# Patient Record
Sex: MALE | Race: WHITE | Employment: OTHER | URBAN - METROPOLITAN AREA
[De-identification: names, ages, dates, MRNs, and addresses within clinical notes are randomized per-mention and may not be internally consistent; named-entity substitution may affect disease eponyms.]

---

## 2017-02-20 ENCOUNTER — DOCUMENTATION ONLY (OUTPATIENT)
Dept: CARDIOLOGY CLINIC | Age: 82
End: 2017-02-20

## 2017-02-20 NOTE — PROGRESS NOTES
Spoke with Mr. Kena Cantu, reviewed device check with him  He was unaware of longer episodes of PAF (2 hours/11 hours) Ventricular rate controlled. He is asymptomatic in general with AF. He denies lightheadedness, dizziness, SOB or fatigue. Still recovering from recent knee replacement. He says he has been taking eliquis 5 mg BID, every day. No reported bleeding.     Eliquis 5 mg BID added to [facility]

## 2017-03-07 ENCOUNTER — OFFICE VISIT (OUTPATIENT)
Dept: CARDIOLOGY CLINIC | Age: 82
End: 2017-03-07

## 2017-03-07 DIAGNOSIS — Z95.0 CARDIAC PACEMAKER IN SITU: Primary | ICD-10-CM

## 2017-05-31 RX ORDER — APIXABAN 5 MG/1
TABLET, FILM COATED ORAL
Qty: 180 TAB | Refills: 1 | Status: SHIPPED | OUTPATIENT
Start: 2017-05-31 | End: 2018-03-08 | Stop reason: SDUPTHER

## 2017-05-31 NOTE — TELEPHONE ENCOUNTER
Requested Prescriptions     Signed Prescriptions Disp Refills    ELIQUIS 5 mg tablet 180 Tab 1     Sig: Take 1 tablet by mouth two  times daily     Authorizing Provider: BEAR GRIDER     Ordering User: Tiffany Ramesh     Verbal order per Dr. Olya Yo. Follow up visit in 1 year on 9/22/17.

## 2017-06-13 ENCOUNTER — OFFICE VISIT (OUTPATIENT)
Dept: CARDIOLOGY CLINIC | Age: 82
End: 2017-06-13

## 2017-06-13 DIAGNOSIS — Z95.0 CARDIAC PACEMAKER IN SITU: Primary | ICD-10-CM

## 2017-08-10 ENCOUNTER — TELEPHONE (OUTPATIENT)
Dept: CARDIOLOGY CLINIC | Age: 82
End: 2017-08-10

## 2017-08-10 NOTE — TELEPHONE ENCOUNTER
Please call Mr. Washington Sat at [phone]. He's leaving to be out of town from 8/14/17 to 9/8/17. He'd like to know if the remote check device should be left on or cut off while he's away.      Thank you, Syeda Almeida

## 2017-08-10 NOTE — TELEPHONE ENCOUNTER
Spoke with patient. Assured him that it is okay to leave monitor on and we will make a note that he will be away from it.

## 2017-09-19 ENCOUNTER — OFFICE VISIT (OUTPATIENT)
Dept: CARDIOLOGY CLINIC | Age: 82
End: 2017-09-19

## 2017-09-19 ENCOUNTER — CLINICAL SUPPORT (OUTPATIENT)
Dept: CARDIOLOGY CLINIC | Age: 82
End: 2017-09-19

## 2017-09-19 VITALS
BODY MASS INDEX: 23.91 KG/M2 | RESPIRATION RATE: 16 BRPM | HEIGHT: 70 IN | DIASTOLIC BLOOD PRESSURE: 60 MMHG | SYSTOLIC BLOOD PRESSURE: 130 MMHG | HEART RATE: 60 BPM | WEIGHT: 167 LBS

## 2017-09-19 DIAGNOSIS — G89.29 CHRONIC PAIN OF BOTH SHOULDERS: ICD-10-CM

## 2017-09-19 DIAGNOSIS — M25.512 CHRONIC PAIN OF BOTH SHOULDERS: ICD-10-CM

## 2017-09-19 DIAGNOSIS — Z79.01 CHRONIC ANTICOAGULATION: ICD-10-CM

## 2017-09-19 DIAGNOSIS — M25.511 CHRONIC PAIN OF BOTH SHOULDERS: ICD-10-CM

## 2017-09-19 DIAGNOSIS — Z95.0 CARDIAC PACEMAKER IN SITU: Primary | ICD-10-CM

## 2017-09-19 DIAGNOSIS — Z95.0 PACEMAKER: Primary | ICD-10-CM

## 2017-09-19 DIAGNOSIS — I48.92 ATRIAL FLUTTER, PAROXYSMAL (HCC): ICD-10-CM

## 2017-09-19 DIAGNOSIS — R05.9 COUGH: ICD-10-CM

## 2017-09-19 DIAGNOSIS — I47.1 PAT (PAROXYSMAL ATRIAL TACHYCARDIA) (HCC): ICD-10-CM

## 2017-09-19 RX ORDER — SIMVASTATIN 40 MG/1
1 TABLET, FILM COATED ORAL
COMMUNITY
Start: 2017-05-30

## 2017-09-19 NOTE — MR AVS SNAPSHOT
Visit Information Date & Time Provider Department Dept. Phone Encounter #  
 9/19/2017 11:00 AM Jolie Rios MD [facility] Mattie Silva [phone] [id number] Your Appointments 10/16/2018 10:30 AM  
PACEMAKER with HERNAN[facility]MON[facility] (ABY SCHEDULING) Appt Note: magnus walton annual thresh/M, see [address] [place]  
[address] [place] [address] Dr  
  
    
 10/16/2018 10:40 AM  
ESTABLISHED PATIENT with Jolie Rios MD  
[facility] ([address]) Appt Note: magnus walton annual thresh/M, see [address] [id number]  
[address] [address]  
  
    
  
 12/27/2017  2:15 PM  
REMOTE OFFICE VISIT with [facility] (ABY SCHEDULING) Appt Note: magnus walton b 9/19/17  
 [address] [facility] [address] [place]  
[address] [address]  
  
    
 4/4/2018 10:00 AM  
REMOTE OFFICE VISIT with [facility] (ABY SCHEDULING) Appt Note: magnus walton  
 [address] [facility] [address] [place]  
[phone]  
  
    
 7/11/2018  9:15 AM  
REMOTE OFFICE VISIT with [facility] (ABY SCHEDULING) Appt Note: magnus walton  
 [address] [facility] [address] [place]  
[phone] Upcoming Health Maintenance Date Due DTaP/Tdap/Td series (1 - Tdap) 1/9/1951 ZOSTER VACCINE AGE 60> 11/9/1989 GLAUCOMA SCREENING Q2Y 1/9/1995 Pneumococcal 65+ Low/Medium Risk (1 of 2 - PCV13) 1/9/1995 MEDICARE YEARLY EXAM 1/9/1995 INFLUENZA AGE 9 TO ADULT 8/1/2017 Allergies as of 9/19/2017  Review Complete On: 9/19/2017 By: Jolie Rios MD  
 No Known Allergies Current Immunizations  Never Reviewed No immunizations on file. Not reviewed this visit You Were Diagnosed With   
  
 Codes Comments Pacemaker    -  Primary ICD-10-CM: Z95.0 ICD-9-CM: V45.01   
 PAT (paroxysmal atrial tachycardia) (HCC)     ICD-10-CM: I47.1 ICD-9-CM: 427.0 Chronic anticoagulation     ICD-10-CM: Z79.01 
ICD-9-CM: V58.61 Atrial flutter, paroxysmal (HCC)     ICD-10-CM: I48.92 
ICD-9-CM: 427.32 Vitals BP Pulse Resp Height(growth percentile) Weight(growth percentile) BMI  
 130/60 (BP 1 Location: Left arm, BP Patient Position: Sitting) 60 16 5' 10\" (1.778 m) 167 lb (75.8 kg) 23.96 kg/m2 Smoking Status Never Smoker Vitals History BMI and BSA Data Body Mass Index Body Surface Area  
 23.96 kg/m 2 1.93 m 2 Preferred Pharmacy Pharmacy Name Phone [address] [facility], [address] [place] [address] [address] [facility] [place] Your Updated Medication List  
  
   
This list is accurate as of: 9/19/17 11:35 AM.  Always use your most recent med list.  
  
  
  
  
 ELIQUIS 5 mg tablet Generic drug:  apixaban Take 1 tablet by mouth two  times daily  
  
 simvastatin 40 mg tablet Commonly known as:  ZOCOR Take 1 Tab by mouth nightly. TYLENOL EXTRA STRENGTH 500 mg tablet Generic drug:  acetaminophen Take 1,000 mg by mouth every six (6) hours as needed for Pain. Patient Instructions You will need to follow up in clinic with Dr. Faustina Lemon in 1 year. Introducing [facility]! [employer] introduces [employer] patient portal. Now you can access parts of your medical record, email your doctor's office, and request medication refills online. 1. In your internet browser, go to [email or web address] 2. Click on the First Time User? Click Here link in the Sign In box. You will see the New Member Sign Up page. 3. Enter your [facility] Access Code exactly as it appears below. You will not need to use this code after youve completed the sign-up process. If you do not sign up before the expiration date, you must request a new code. · [facility] Access Code: [email or web address] Expires: 12/18/2017 10:56 AM 
 
4. Enter the last four digits of your Social Security Number (xxxx) and Date of Birth (mm/dd/yyyy) as indicated and click Submit. You will be taken to the next sign-up page. 5. Create a [facility] ID. This will be your [facility] login ID and cannot be changed, so think of one that is secure and easy to remember. 6. Create a [facility] password. You can change your password at any time. 7. Enter your Password Reset Question and Answer. This can be used at a later time if you forget your password. 8. Enter your e-mail address. You will receive e-mail notification when new information is available in [address]. 9. Click Sign Up. You can now view and download portions of your medical record. 10. Click the Download Summary menu link to download a portable copy of your medical information. If you have questions, please visit the Frequently Asked Questions section of the [facility] website. Remember, [facility] is NOT to be used for urgent needs. For medical emergencies, dial 911. Now available from your iPhone and Android! Please provide this summary of care documentation to your next provider. Your primary care clinician is listed as Thai Escobar. If you have any questions after today's visit, please call [phone].

## 2017-09-19 NOTE — PROGRESS NOTES
Cardiac Electrophysiology Office Note     Subjective:      Esther Knowles is an 80 y.o. man who is here today for follow up after pacemaker generator change. He had right sided knee surgery by Dr Chuck Matthew at [facility] before that   He has the pacemaker that was implanted by Dr. Susan Whyte. 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 The patient had device check that showed atrial flutter in the past.    I reviewed and signed off the pacemaker report in April and in May 2015 he was seen by Dr. Medina Briones and was recommended to have the atrial flutter ablation. He had some reservations about it, and that is why he came in to see me for a second opinion and I recommend against it because he did not appear to have much symptoms from the atrial flutter. He had known hx of vertigo   Meclizine and then elavil at night did not help either  He had CT 8/26/16 of the head that was negative for CVA. He is here today for follow up, he does not currently fell well, + cough and malaise. 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 He is going to see his PCP today for the above complaints. He denies recent hospitalizations. He denies palpitations, irregular heart beat, SOB, chest pain or LE edema. No syncope. No blood in the stool or urine, no black stools while taking the Pradaxa.        Cardiographics   [employer] cardiolite stress test 9/13/2016 showed atrial sensing and ventricular pacing  Fixed apical septal defect due to RV apical pacing  Fixed basal inferior and inferoseptal defect worse on rest than stress is due to gut uptake and diaphragm attenuation artifact  No ischemia  LVEF normal 64%      Patient Active Problem List   Diagnosis Code    Arrhythmia Sinus Bradycardia I49.5    Mobitz type II atrioventricular block I44.1    Paroxysmal ventricular tachycardia (HCC) I47.2    Other and unspecified hyperlipidemia E78.5    Atrial flutter, paroxysmal (HCC) I48.92    Anticoagulated Z79.01    Pacemaker Z95.0     Current Outpatient Prescriptions   Medication Sig Dispense Refill    simvastatin (ZOCOR) 40 mg tablet Take 1 Tab by mouth nightly.  ELIQUIS 5 mg tablet Take 1 tablet by mouth two  times daily 180 Tab 1    acetaminophen (TYLENOL EXTRA STRENGTH) 500 mg tablet Take 1,000 mg by mouth every six (6) hours as needed for Pain.  HYDROcodone-acetaminophen (NORCO) 5-325 mg per tablet Take 1-2 Tabs by mouth every four (4) hours as needed for Pain. Max Daily Amount: 12 Tabs. 30 Tab 0    ondansetron (ZOFRAN ODT) 4 mg disintegrating tablet Take 1 Tab by mouth every four (4) hours as needed for Nausea. 20 Tab 2    SIMVASTATIN PO Take 40 mg by mouth daily (with dinner). No Known Allergies  Past Medical History:   Diagnosis Date    Arrhythmia Sinus Bradycardia     DJD (degenerative joint disease) of cervical spine     DJD (degenerative joint disease) of hip     Hyperlipidemia     Ill-defined condition 09/2016    Pneumonia    Mobitz (type) II atrioventricular block     Pacemaker      Past Surgical History:   Procedure Laterality Date    GEN INSERT/REPLACE ONLY DUAL  8/3/2016         HX HERNIA REPAIR Right     Inguinal Hernia    HX KNEE REPLACEMENT Left 07/2016    Partial Knee Replacement    HX MOHS PROCEDURES      HX PACEMAKER  2008 & 2016    Right Upper Chest    HX ROTATOR CUFF REPAIR Right 1998 & 2003    HX UROLOGICAL      TURP-for BPH     No family history of pacemaker     Social History   Substance Use Topics    Smoking status: Never Smoker    Smokeless tobacco: Never Used    Alcohol use No        Review of Systems:   Constitutional: Negative for fever, chills, weight loss, malaise/fatigue. HEENT: Negative for nosebleeds, vision changes.    Respiratory: + cough, neg hemoptysis, sputum production, and wheezing. _+congested cough  Cardiovascular: Negative for chest pain, palpitations, orthopnea, claudication, leg swelling, syncope, and PND. Gastrointestinal: Negative for nausea, vomiting, diarrhea, constipation, blood in stool and melena. Genitourinary: Negative for dysuria, and hematuria. Musculoskeletal: Negative for myalgias, + arthralgia knee and shouldner. Skin: Negative for rash. Heme: Does not bleed or bruise easily. Neurological: Negative for speech change and focal weakness + dizziness     Objective:     Visit Vitals    /60 (BP 1 Location: Left arm, BP Patient Position: Sitting)    Pulse 60    Resp 16    Ht 5' 10\" (1.778 m)    Wt 167 lb (75.8 kg)    BMI 23.96 kg/m2       Physical Exam:   Constitutional: well-developed and well-nourished. No distress. Head: Normocephalic and atraumatic. Eyes: Pupils are equal, round  Neck: supple. No JVD present. Cardiovascular: Normal rate, regular rhythm. Exam reveals no gallop and no friction rub. No murmur heard. Pulmonary/Chest: Effort normal and breath sounds normal. No wheezes. Abdominal: Soft, no tenderness. Musculoskeletal: no edema. Neurological: alert,oriented. Skin: Skin is warm and dry normal right sided pacemaker  Psychiatric: normal mood and affect. Behavior is normal. Judgment and thought content normal.      Assessment/Plan:       ICD-10-CM ICD-9-CM    1. Pacemaker Z95.0 V45.01    2. PAT (paroxysmal atrial tachycardia) ([facility]) I47.1 427.0    3. Chronic anticoagulation Z79.01 V58.61    4. Atrial flutter, paroxysmal ([facility]) I48.92 427.32         Reviewed device check, 99% , 116 episode of PAT. He is asymptomatic with atrial tachycardia. He is tolerating the eliquis without bleeding side effects. He will follow up with his PCP today. BP controlled. He is tolerating the eliquis without side effects. Thank you for involving me in this patient's care and please call with further concerns or questions.     Juaquin Dorantes NP  [phone]

## 2017-09-19 NOTE — PROGRESS NOTES
Cardiac Electrophysiology Office Note     Subjective:      Rajesh Hawkins is an 80 y.o. man who is here today for follow up on pacemaker   He had generator change in the past.  He had right sided knee surgery by Dr Chloé Ordonez at [facility] before that   He has the pacemaker that was implanted by Dr. Clarisa Song. 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 The patient had device check that showed atrial flutter/atrial tach in the past.   I reviewed and signed off the pacemaker report in April and in May 2015 he was seen by Dr. Tata Peterson and was recommended to have the atrial flutter ablation. He had some reservations about it, and that is why he came in to see me for a second opinion and I recommend against it because he did not appear to have much symptoms from the atrial flutter/atrial tach. He had known hx of vertigo   Meclizine and then elavil at night did not help either  He had had cataract surgery of right eye and does not think it is good  He had CT 8/26/16 of the head that was negative for CVA. Today he does not currently fell well, + cough and malaise. 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 He is going to see his PCP today for the above complaints. He denies recent hospitalizations. He denies palpitations, irregular heart beat, SOB, chest pain or LE edema. No syncope. No blood in the stool or urine, no black stools while taking the Pradaxa.        Cardiographics   [facility] cardiolite stress test 9/13/2016 showed atrial sensing and ventricular pacing  Fixed apical septal defect due to RV apical pacing  Fixed basal inferior and inferoseptal defect worse on rest than stress is due to gut uptake and diaphragm attenuation artifact  No ischemia  LVEF normal 64%      Patient Active Problem List   Diagnosis Code    Arrhythmia Sinus Bradycardia I49.5    Mobitz type II atrioventricular block I44.1    Paroxysmal ventricular tachycardia (HCC) I47.2    Other and unspecified hyperlipidemia E78.5    Atrial flutter, paroxysmal (HCC) I48.92    Anticoagulated Z79.01    Pacemaker Z95.0     Current Outpatient Prescriptions   Medication Sig Dispense Refill    simvastatin (ZOCOR) 40 mg tablet Take 1 Tab by mouth nightly.  ELIQUIS 5 mg tablet Take 1 tablet by mouth two  times daily 180 Tab 1    acetaminophen (TYLENOL EXTRA STRENGTH) 500 mg tablet Take 1,000 mg by mouth every six (6) hours as needed for Pain. No Known Allergies  Past Medical History:   Diagnosis Date    Arrhythmia Sinus Bradycardia     DJD (degenerative joint disease) of cervical spine     DJD (degenerative joint disease) of hip     Hyperlipidemia     Ill-defined condition 09/2016    Pneumonia    Mobitz (type) II atrioventricular block     Pacemaker      Past Surgical History:   Procedure Laterality Date    GEN INSERT/REPLACE ONLY DUAL  8/3/2016         HX HERNIA REPAIR Right     Inguinal Hernia    HX KNEE REPLACEMENT Left 07/2016    Partial Knee Replacement    HX MOHS PROCEDURES      HX PACEMAKER  2008 & 2016    Right Upper Chest    HX ROTATOR CUFF REPAIR Right 1998 & 2003    HX UROLOGICAL      TURP-for BPH     No family history of pacemaker     Social History   Substance Use Topics    Smoking status: Never Smoker    Smokeless tobacco: Never Used    Alcohol use No        Review of Systems:   Constitutional: Negative for fever, chills, weight loss, malaise/fatigue. HEENT: Negative for nosebleeds, vision changes. Respiratory: + cough, neg hemoptysis, sputum production, and wheezing. +congested cough  Cardiovascular: Negative for chest pain, palpitations, orthopnea, claudication, leg swelling, syncope, and PND. Gastrointestinal: Negative for nausea, vomiting, diarrhea, constipation, blood in stool and melena. Genitourinary: Negative for dysuria, and hematuria. Musculoskeletal: Negative for myalgias, + arthralgia knee and shouldner.    Skin: Negative for rash. Heme: Does not bleed or bruise easily. Neurological: Negative for speech change and focal weakness + dizziness     Objective:     Visit Vitals    /60 (BP 1 Location: Left arm, BP Patient Position: Sitting)    Pulse 60    Resp 16    Ht 5' 10\" (1.778 m)    Wt 167 lb (75.8 kg)    BMI 23.96 kg/m2       Physical Exam:   Constitutional: well-developed and well-nourished. No distress. Head: Normocephalic and atraumatic. Eyes: Pupils are equal, round  Neck: supple. No JVD present. Cardiovascular: Normal rate, regular rhythm. Exam reveals no gallop and no friction rub. No murmur heard. Pulmonary/Chest: bilateral rhonchi. No wheezes. Abdominal: Soft, no tenderness. Musculoskeletal: no edema. Neurological: alert,oriented. Skin: Skin is warm and dry normal right sided pacemaker  Psychiatric: normal mood and affect. Behavior is normal. Judgment and thought content normal.      Assessment/Plan:       ICD-10-CM ICD-9-CM    1. Pacemaker Z95.0 V45.01    2. PAT (paroxysmal atrial tachycardia) ([facility]) I47.1 427.0    3. Chronic anticoagulation Z79.01 V58.61    4. Atrial flutter, paroxysmal ([facility]) I48.92 427.32    5. Cough R05 786.2    6. Chronic pain of both shoulders M25.512 719.41     G89.29 338.29     M25.511          Reviewed device check, 99% , 116 episode of PAT, short duration 1.5 min or less. He is asymptomatic with atrial tachycardia. He is tolerating the eliquis without bleeding side effects. He had short atrial flutter before  He will follow up with his PCP today for coughs and I offered to get CXR but he did not want to. BP controlled. Follow-up Disposition:  Return in about 1 year (around 9/19/2018). Check pacer remotely every 3 months  He is dependent and we talk about software upgrade for cypersecurity and we decided to not do that today      Thank you for involving me in this patient's care and please call with further concerns or questions.

## 2017-12-13 ENCOUNTER — TELEPHONE (OUTPATIENT)
Dept: CARDIOLOGY CLINIC | Age: 82
End: 2017-12-13

## 2017-12-13 NOTE — TELEPHONE ENCOUNTER
Verified patient with two types of identifiers. States that he has been off of eliquis for the past 2 months and has been taking ASA 81mg every day. Advised her that we only hold eliquis 48 hours prior to procedure. When asked who told him to stop the eliquis the wife states that the knee surgeon told then to stop a few weeks prior to his surgery and to take ASA 81mg every day and he has been doing this ever since then. He has his other knee surgery scheduled for 12/20/17 and is not sure what they should do at this point. Will check with MD/NP.

## 2017-12-13 NOTE — TELEPHONE ENCOUNTER
Verified patient with two types of identifiers. Notified patient to stop ASA and restart Eliquis tonight. Notified patient to hold Eliquis 2 days prior to surgery and to resume per Surgeon. Notified patient to make sure he has a specific date to re-start his Eliquis prior to leaving the hospital after his surgery. Patient verbalized understanding and will call with any other questions.

## 2017-12-13 NOTE — TELEPHONE ENCOUNTER
Not sure why eliquis was discontinued, we will get ortho note.   He should resume eliquis 5 mg BID, stop ASA   For upcoming knee surgery he may hold eliquis 5 mg BID and resume eliquis per surgeon

## 2017-12-13 NOTE — TELEPHONE ENCOUNTER
Pt called in to advise you of his upcoming knee replacement surgery in one week. He has been taking Eloquist and was taken off and put on baby aspirin. Has some questions regarding his meds.   Phone [phone]  SK

## 2017-12-27 ENCOUNTER — OFFICE VISIT (OUTPATIENT)
Dept: CARDIOLOGY CLINIC | Age: 82
End: 2017-12-27

## 2017-12-27 DIAGNOSIS — Z95.0 CARDIAC PACEMAKER IN SITU: Primary | ICD-10-CM

## 2018-03-08 RX ORDER — APIXABAN 5 MG/1
TABLET, FILM COATED ORAL
Qty: 180 TAB | Refills: 1 | Status: SHIPPED | OUTPATIENT
Start: 2018-03-08 | End: 2018-09-20 | Stop reason: SDUPTHER

## 2018-03-08 NOTE — TELEPHONE ENCOUNTER
Request for Eliquis 5 mg BID. Last office visit 9/19/17, next office visit 10/16/18. Refills per verbal order from Dr. Artem Sarah.

## 2018-04-04 ENCOUNTER — OFFICE VISIT (OUTPATIENT)
Dept: CARDIOLOGY CLINIC | Age: 83
End: 2018-04-04

## 2018-04-04 DIAGNOSIS — Z95.0 CARDIAC PACEMAKER IN SITU: Primary | ICD-10-CM

## 2018-05-07 ENCOUNTER — TELEPHONE (OUTPATIENT)
Dept: CARDIOLOGY CLINIC | Age: 83
End: 2018-05-07

## 2018-05-08 ENCOUNTER — TELEPHONE (OUTPATIENT)
Dept: CARDIOLOGY CLINIC | Age: 83
End: 2018-05-08

## 2018-05-08 NOTE — TELEPHONE ENCOUNTER
Pt called to see if he can do a remote check on 5/10 because he did not want to come into the office on 5/10. Pt states he wanted to cancel the appointment and I asked him even if he couldn't do a remote check did he want to cancel and he said yes. Pt can be reached @ [phone]. Thanks!

## 2018-05-08 NOTE — TELEPHONE ENCOUNTER
Left voice mail for patient. His next remote is not scheduled until 7/11/18 and his in clinic appointment is 10/16/18.

## 2018-07-11 ENCOUNTER — OFFICE VISIT (OUTPATIENT)
Dept: CARDIOLOGY CLINIC | Age: 83
End: 2018-07-11

## 2018-07-11 DIAGNOSIS — Z95.0 CARDIAC PACEMAKER IN SITU: Primary | ICD-10-CM

## 2018-09-20 RX ORDER — APIXABAN 5 MG/1
TABLET, FILM COATED ORAL
Qty: 180 TAB | Refills: 1 | Status: SHIPPED | OUTPATIENT
Start: 2018-09-20 | End: 2019-03-22 | Stop reason: SDUPTHER

## 2018-09-20 NOTE — TELEPHONE ENCOUNTER
Request for Eliquis 5 mg BID. Last office visit 9/19/17, next office visit 10/16/18. Refills per verbal order from Dr. Mine Velasquez.

## 2018-10-16 ENCOUNTER — OFFICE VISIT (OUTPATIENT)
Dept: CARDIOLOGY CLINIC | Age: 83
End: 2018-10-16

## 2018-10-16 ENCOUNTER — CLINICAL SUPPORT (OUTPATIENT)
Dept: CARDIOLOGY CLINIC | Age: 83
End: 2018-10-16

## 2018-10-16 VITALS
SYSTOLIC BLOOD PRESSURE: 152 MMHG | WEIGHT: 170 LBS | HEIGHT: 70 IN | HEART RATE: 74 BPM | DIASTOLIC BLOOD PRESSURE: 70 MMHG | BODY MASS INDEX: 24.34 KG/M2

## 2018-10-16 DIAGNOSIS — Z79.01 CHRONIC ANTICOAGULATION: ICD-10-CM

## 2018-10-16 DIAGNOSIS — I44.1 MOBITZ TYPE II ATRIOVENTRICULAR BLOCK: ICD-10-CM

## 2018-10-16 DIAGNOSIS — I48.92 ATRIAL FLUTTER, PAROXYSMAL (HCC): ICD-10-CM

## 2018-10-16 DIAGNOSIS — Z95.0 CARDIAC PACEMAKER IN SITU: Primary | ICD-10-CM

## 2018-10-16 DIAGNOSIS — I47.1 PAT (PAROXYSMAL ATRIAL TACHYCARDIA) (HCC): ICD-10-CM

## 2018-10-16 NOTE — PROGRESS NOTES
Cardiac Electrophysiology Office Note     Subjective:      Mihcel Rodriguez is an 80 y.o. man who is here today for follow up on pacemaker   The patient is doing well. He does not have chest pain or shortness of breath, dizziness or syncope. He has paroxysmal atrial tachycardia, but asymptomatic. He had right eye cataract surgery before, but there is some scar tissue that he needs to see another ophthalmologist for on [facility] to have that surgery done. He has easy bruising with no bleeding with Eliquis. He had generator change in the past.  He had right sided knee surgery by Dr Ty Sanchez at [facility] before that   He has the pacemaker that was implanted by Dr. Tata Hannah. 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 The patient had device check that showed atrial flutter/atrial tach in the past.   I reviewed and signed off the pacemaker report in April and in May 2015 he was seen by Dr. Africa Thomas and was recommended to have the atrial flutter ablation. He had some reservations about it, and that is why he came in to see me for a second opinion and I recommend against it because he did not appear to have much symptoms from the atrial flutter/atrial tach. He had known hx of vertigo   Meclizine and then elavil at night did not help either  He had had cataract surgery of right eye and does not think it is good  He had CT 8/26/16 of the head that was negative for CVA.      Cardiographics   [facility] cardiolite stress test 9/13/2016 showed atrial sensing and ventricular pacing  Fixed apical septal defect due to RV apical pacing  Fixed basal inferior and inferoseptal defect worse on rest than stress is due to gut uptake and diaphragm attenuation artifact  No ischemia  LVEF normal 64%      Patient Active Problem List   Diagnosis Code    Arrhythmia Sinus Bradycardia I49.5    Mobitz type II atrioventricular block I44.1    Paroxysmal ventricular tachycardia (HCC) I47.2    Other and unspecified hyperlipidemia E78.5    Atrial flutter, paroxysmal (HCC) I48.92    Anticoagulated Z79.01    Pacemaker Z95.0     Current Outpatient Prescriptions   Medication Sig Dispense Refill    ELIQUIS 5 mg tablet TAKE 1 TABLET BY MOUTH TWO  TIMES DAILY 180 Tab 1    simvastatin (ZOCOR) 40 mg tablet Take 1 Tab by mouth nightly.  acetaminophen (TYLENOL EXTRA STRENGTH) 500 mg tablet Take 1,000 mg by mouth every six (6) hours as needed for Pain. No Known Allergies  Past Medical History:   Diagnosis Date    Arrhythmia Sinus Bradycardia     DJD (degenerative joint disease) of cervical spine     DJD (degenerative joint disease) of hip     Hyperlipidemia     Ill-defined condition 09/2016    Pneumonia    Mobitz (type) II atrioventricular block     Pacemaker      Past Surgical History:   Procedure Laterality Date    GEN INSERT/REPLACE ONLY DUAL  8/3/2016         HX HERNIA REPAIR Right     Inguinal Hernia    HX KNEE REPLACEMENT Left 07/2016    Partial Knee Replacement    HX MOHS PROCEDURES      HX PACEMAKER  2008 & 2016    Right Upper Chest    HX ROTATOR CUFF REPAIR Right 1998 & 2003    HX UROLOGICAL      TURP-for BPH     No family history of pacemaker     Social History   Substance Use Topics    Smoking status: Never Smoker    Smokeless tobacco: Never Used    Alcohol use No        Review of Systems:   Constitutional: Negative for fever, chills, weight loss, malaise/fatigue. HEENT: Negative for nosebleeds, vision changes. Respiratory: neg hemoptysis, sputum production, and wheezing. Cardiovascular: Negative for chest pain, palpitations, orthopnea, claudication, leg swelling, syncope, and PND. Gastrointestinal: Negative for nausea, vomiting, diarrhea, constipation, blood in stool and melena. Genitourinary: Negative for dysuria, and hematuria. Musculoskeletal: Negative for myalgias, + arthralgia knee and shouldner. Skin: Negative for rash. Heme: Does not bleed or bruise easily.    Neurological: Negative for speech change and focal weakness      Objective:     Visit Vitals    /70 (BP 1 Location: Left arm, BP Patient Position: Sitting)    Pulse 74    Ht 5' 10\" (1.778 m)    Wt 170 lb (77.1 kg)    BMI 24.39 kg/m2       Physical Exam:   Constitutional: well-developed and well-nourished. No distress. Head: Normocephalic and atraumatic. Eyes: Pupils are equal, round  Neck: supple. No JVD present. Cardiovascular: Normal rate, regular rhythm. Exam reveals no gallop and no friction rub. No murmur heard. Pulmonary/Chest: No wheezes. Abdominal: Soft, no tenderness. Musculoskeletal: no edema. Neurological: alert,oriented. Skin: Skin is warm and dry normal right sided pacemaker  Psychiatric: normal mood and affect. Behavior is normal. Judgment and thought content normal.      Assessment/Plan:       ICD-10-CM ICD-9-CM    1. Cardiac pacemaker in situ Z95.0 V45.01    2. PAT (paroxysmal atrial tachycardia) ([facility]) I47.1 427.0    3. Atrial flutter, paroxysmal ([facility]) I48.92 427.32    4. Chronic anticoagulation Z79.01 V58.61    5. Mobitz type II atrioventricular block I44.1 426.12        Reviewed device check, 94% AP 99% , short episodes of PAT. He is asymptomatic with atrial tachycardia. He is tolerating the eliquis without bleeding side effects. He had short atrial flutter before  He can hold eliquis for eye surgery when he is ready  BP controlled. Battery longevity of pacemaker is estimated to be about 9-10 years. His wife recently had a pacemaker done and he would like to have her coming along over here for the pacemaker follow up and be managed with me. He will call if that is what she wants.     Check pacer remotely every 3 months     Future Appointments   Date Time Provider Katherin Pelaez   2/6/2019  1:00 PM REMOTE1, [address]   5/22/2019  1:45 PM REMOTE1, [address]   8/28/2019 11:45 AM REMOTE1, [address]   11/21/2019 10:30 AM [address] [facility], [address] [facility]   11/21/2019 10:40 AM Abundio Cantu  [address]         Thank you for involving me in this patient's care and please call with further concerns or questions.

## 2018-10-16 NOTE — MR AVS SNAPSHOT
[address] [facility] [address] [place] [address] 
[phone] Patient: Milton Quispe MRN: [id number] BKZ:[id number] Visit Information Date & Time Provider Department Dept. Phone Encounter #  
 10/16/2018 10:40 AM Ree Vega MD [facility] Tatiana Jimenez [phone] [id number] Your Appointments 11/21/2019 10:30 AM  
PACEMAKER with PACEMAKER3MONCHO [facility] (AB[facility] SCHEDULING) Appt Note: MDT/ PPI/ RC/  CL b Annual [address] [place] [address] 63 1000 [facility]  
  
    
 11/21/2019 10:40 AM  
ESTABLISHED PATIENT with Ree Vega MD  
[facility] ([facility]) Appt Note: MDT/ PPI/ RC/  CL b Annual [address] [place] [address] 63 3200 [facility] [address]  
  
    
  
 2/6/2019  1:00 PM  
REMOTE OFFICE VISIT with Berta German [facility] ([facility] SCHEDULING) Appt Note: Merlin/ PPI/ RC/ b 10/16/18  
 [address] [place] [address] [address] [address]  
  
    
 5/22/2019  1:45 PM  
REMOTE OFFICE VISIT with Berta German [facility] ([facility] SCHEDULING) Appt Note: MDT/ PPI/ RC/  CL b  
 [address] [place] [address]  
[phone]  
  
    
 8/28/2019 11:45 AM  
REMOTE OFFICE VISIT with Berta German [facility] ([facility] SCHEDULING) Appt Note: MDT/ PPI/ RC/  CL b  
 [address] [place] [address]  
[phone] Upcoming Health Maintenance Date Due DTaP/Tdap/Td series (1 - Tdap) 1/9/1951 Shingrix Vaccine Age 50> (1 of 2) 1/9/1980 GLAUCOMA SCREENING Q2Y 1/9/1995 Pneumococcal 65+ Low/Medium Risk (1 of 2 - PCV13) 1/9/1995 MEDICARE YEARLY EXAM 3/14/2018 Influenza Age 5 to Adult 8/1/2018 Allergies as of 10/16/2018  Review Complete On: 10/16/2018 By: Ree Vega MD  
 No Known Allergies Current Immunizations  Never Reviewed No immunizations on file. Not reviewed this visit Vitals BP Pulse Height(growth percentile) Weight(growth percentile) BMI Smoking Status 152/70 (BP 1 Location: Left arm, BP Patient Position: Sitting) 74 5' 10\" (1.778 m) 170 lb (77.1 kg) 24.39 kg/m2 Never Smoker Vitals History BMI and BSA Data Body Mass Index Body Surface Area  
 24.39 kg/m 2 1.95 m 2 Preferred Pharmacy Pharmacy Name Phone [facility], [address] [place] [address] [address] Your Updated Medication List  
  
   
This list is accurate as of 10/16/18 11:21 AM.  Always use your most recent med list.  
  
  
  
  
 ELIQUIS 5 mg tablet Generic drug:  apixaban TAKE 1 TABLET BY MOUTH TWO  TIMES DAILY  
  
 simvastatin 40 mg tablet Commonly known as:  ZOCOR Take 1 Tab by mouth nightly. TYLENOL EXTRA STRENGTH 500 mg tablet Generic drug:  acetaminophen Take 1,000 mg by mouth every six (6) hours as needed for Pain. Patient Instructions You will need to follow up in clinic with Dr. Mk Cole in 12 months. Introducing [facility]! [employer] introduces [employer] patient portal. Now you can access parts of your medical record, email your doctor's office, and request medication refills online. 1. In your internet browser, go to [email or web address] 2. Click on the First Time User? Click Here link in the Sign In box. You will see the New Member Sign Up page. 3. Enter your [employer] Access Code exactly as it appears below. You will not need to use this code after youve completed the sign-up process.  If you do not sign up before the expiration date, you must request a new code. · [employer] Access Code: [email or web address] Expires: 1/14/2019 11:00 AM 
 
4. Enter the last four digits of your Social Security Number (xxxx) and Date of Birth (mm/dd/yyyy) as indicated and click Submit. You will be taken to the next sign-up page. 5. Create a [employer] ID. This will be your [employer] login ID and cannot be changed, so think of one that is secure and easy to remember. 6. Create a [employer] password. You can change your password at any time. 7. Enter your Password Reset Question and Answer. This can be used at a later time if you forget your password. 8. Enter your e-mail address. You will receive e-mail notification when new information is available in [address] 9. Click Sign Up. You can now view and download portions of your medical record. 10. Click the Download Summary menu link to download a portable copy of your medical information. If you have questions, please visit the Frequently Asked Questions section of the [employer] website. Remember, [employer] is NOT to be used for urgent needs. For medical emergencies, dial 911. Now available from your iPhone and Android! Please provide this summary of care documentation to your next provider. Your primary care clinician is listed as Guicho Coburn. If you have any questions after today's visit, please call [phone].

## 2019-02-06 ENCOUNTER — OFFICE VISIT (OUTPATIENT)
Dept: CARDIOLOGY CLINIC | Age: 84
End: 2019-02-06

## 2019-02-06 DIAGNOSIS — Z95.0 CARDIAC PACEMAKER IN SITU: Primary | ICD-10-CM

## 2019-02-08 ENCOUNTER — DOCUMENTATION ONLY (OUTPATIENT)
Dept: CARDIOLOGY CLINIC | Age: 84
End: 2019-02-08

## 2019-02-08 NOTE — PROGRESS NOTES
Pacemaker alert 2 second NSVT 2/6/2019 at 202 bpm  Stress test 9/2016 normal  Future Appointments   Date Time Provider Katherin Johnsoni   5/22/2019  1:45 PM Anneliese BADILLO SCHED   8/28/2019 11:45 AM REMOTE1, MONCHO BADILLO SCHED   11/21/2019 10:30 AM PACEMAKER3, MONCHO BADILLO SCHED   11/21/2019 10:40 AM Sahil Garcia  [address]

## 2019-03-22 RX ORDER — APIXABAN 5 MG/1
TABLET, FILM COATED ORAL
Qty: 180 TAB | Refills: 1 | Status: SHIPPED | OUTPATIENT
Start: 2019-03-22 | End: 2019-09-23 | Stop reason: SDUPTHER

## 2019-03-22 NOTE — TELEPHONE ENCOUNTER
Requested Prescriptions     Signed Prescriptions Disp Refills    ELIQUIS 5 mg tablet 180 Tab 1     Sig: TAKE 1 TABLET BY MOUTH TWO  TIMES DAILY     Authorizing Provider: BEAR GRIDER     Ordering User: Denae Freeman       Per Dr. Isis Corona   Date Time Provider Katherin Pelaez   5/22/2019  1:45 PM Michael BADILLO SCHED   8/28/2019 11:45 AM REMOTE1, MONCHO BADILLO SCHED   11/21/2019 10:30 AM PACEMAKER3, MONCHO BAD[place] SCHED   11/21/2019 10:40 AM Pop Calle  [address]

## 2019-05-22 ENCOUNTER — OFFICE VISIT (OUTPATIENT)
Dept: CARDIOLOGY CLINIC | Age: 84
End: 2019-05-22

## 2019-05-22 DIAGNOSIS — Z95.0 CARDIAC PACEMAKER IN SITU: Primary | ICD-10-CM

## 2019-08-28 ENCOUNTER — OFFICE VISIT (OUTPATIENT)
Dept: CARDIOLOGY CLINIC | Age: 84
End: 2019-08-28

## 2019-08-28 DIAGNOSIS — Z95.0 CARDIAC PACEMAKER IN SITU: Primary | ICD-10-CM

## 2019-09-23 RX ORDER — APIXABAN 5 MG/1
TABLET, FILM COATED ORAL
Qty: 180 TAB | Refills: 1 | Status: SHIPPED | OUTPATIENT
Start: 2019-09-23 | End: 2020-04-24

## 2019-09-23 NOTE — TELEPHONE ENCOUNTER
Cardiologist: Dr. Wes Marquez    Last appt: 8/28/2019  Future Appointments   Date Time Provider Katherin Bostonisti   11/21/2019 10:30 AM PACEMAKER3, [address]   11/21/2019 10:40 AM Raul Landry  [address]       Requested Prescriptions     Signed Prescriptions Disp Refills    ELIQUIS 5 mg tablet 180 Tab 1     Sig: TAKE 1 TABLET BY MOUTH TWO  TIMES DAILY     Authorizing Provider: BEAR GRIDER     Ordering User: HONG GAYLE         Refills VO per Dr. Wes Marquez.

## 2019-11-21 ENCOUNTER — CLINICAL SUPPORT (OUTPATIENT)
Dept: CARDIOLOGY CLINIC | Age: 84
End: 2019-11-21

## 2019-11-21 ENCOUNTER — OFFICE VISIT (OUTPATIENT)
Dept: CARDIOLOGY CLINIC | Age: 84
End: 2019-11-21

## 2019-11-21 VITALS
HEIGHT: 70 IN | WEIGHT: 173 LBS | OXYGEN SATURATION: 98 % | BODY MASS INDEX: 24.77 KG/M2 | RESPIRATION RATE: 16 BRPM | SYSTOLIC BLOOD PRESSURE: 120 MMHG | DIASTOLIC BLOOD PRESSURE: 60 MMHG | HEART RATE: 60 BPM

## 2019-11-21 DIAGNOSIS — Z79.01 CHRONIC ANTICOAGULATION: ICD-10-CM

## 2019-11-21 DIAGNOSIS — Z95.0 CARDIAC PACEMAKER IN SITU: Primary | ICD-10-CM

## 2019-11-21 DIAGNOSIS — I48.92 ATRIAL FLUTTER, PAROXYSMAL (HCC): ICD-10-CM

## 2019-11-21 DIAGNOSIS — I44.1 MOBITZ TYPE II ATRIOVENTRICULAR BLOCK: ICD-10-CM

## 2019-11-21 DIAGNOSIS — I47.1 PAT (PAROXYSMAL ATRIAL TACHYCARDIA) (HCC): ICD-10-CM

## 2019-11-22 NOTE — PROGRESS NOTES
Cardiac Electrophysiology Office Note     Subjective:      Polo Galan is an 80 y.o. man who is here today for follow up, is s/p [employer] dual chamber pacemaker (DOI 08/03/2016, leads 05/13/2009). Device check today showed proper lead & generator function. Generator longevity estimated 9 yrs, 8 mo. RA 94%, RV 99.78%. Since 08/28/2019, 15 episodes AT (4-76 seconds). No NSVT since last check. His main complaint is frequent vertigo, but he denies any falls. He does states that right knee also gives out occasionally. He denies chest pain, palpitations, SOB, PND, orthopnea, syncope, or edema. Asymptomatic with AT. BP well controlled, 120/60. Anticoagulated with Eliquis, denies bleeding issues. Previous:  NSVT (02/06/2019), 2 seconds, 202 bpm.    Lexiscan cardiolite stress (09/13/2016): LVEF 64%, no ischemia. Echo (07/29/2016): LVEF 60-65%, no RWMA. RV size at upper limits of normal.  Mild MAXIME. Mild MAC, mild MR. Mild TR. PASP 38 mmHg. Pacemaker initially implanted by Dr. Cher Ricketts. Atrial flutter ablation had been recommended by Dr. Genie Hernandez, but patient sought out 2nd opinion. Given lack of symptoms, ablation was not recommended by me.       Problem List  Date Reviewed: 11/21/2019          Codes Class Noted    Atrial flutter, paroxysmal ([facility] ICD-10-CM: I48.92  ICD-9-CM: 427.32  9/30/2015        Anticoagulated ICD-10-CM: Z79.01  ICD-9-CM: V58.61  9/30/2015        Pacemaker ICD-10-CM: Z95.0  ICD-9-CM: V45.01  9/30/2015        Paroxysmal ventricular tachycardia ([facility] ICD-10-CM: I47.2  ICD-9-CM: 427.1  8/28/2012        Other and unspecified hyperlipidemia ICD-10-CM: E78.5  ICD-9-CM: 272.4  8/28/2012        Arrhythmia Sinus Bradycardia ICD-10-CM: I49.5  ICD-9-CM: 427.81  Unknown        Mobitz type II atrioventricular block ICD-10-CM: I44.1  ICD-9-CM: 426.12  Unknown              Current Outpatient Medications   Medication Sig Dispense Refill    ELIQUIS 5 mg tablet TAKE 1 TABLET BY MOUTH TWO  TIMES DAILY 180 Tab 1    simvastatin (ZOCOR) 40 mg tablet Take 1 Tab by mouth nightly.  acetaminophen (TYLENOL EXTRA STRENGTH) 500 mg tablet Take 1,000 mg by mouth every six (6) hours as needed for Pain. No Known Allergies  Past Medical History:   Diagnosis Date    Arrhythmia Sinus Bradycardia     DJD (degenerative joint disease) of cervical spine     DJD (degenerative joint disease) of hip     Hyperlipidemia     Ill-defined condition 09/2016    Pneumonia    Mobitz (type) II atrioventricular block     Pacemaker      Past Surgical History:   Procedure Laterality Date    GEN INSERT/REPLACE ONLY DUAL  8/3/2016         HX HERNIA REPAIR Right     Inguinal Hernia    HX KNEE REPLACEMENT Left 07/2016    Partial Knee Replacement    HX MOHS PROCEDURES      HX PACEMAKER  2008 & 2016    Right Upper Chest    HX ROTATOR CUFF REPAIR Right 1998 & 2003    HX UROLOGICAL      TURP-for BPH     No family history of pacemaker     Social History     Tobacco Use    Smoking status: Never Smoker    Smokeless tobacco: Never Used   Substance Use Topics    Alcohol use: No     Alcohol/week: 0.0 standard drinks        Review of Systems:   Constitutional: Negative for fever, chills, weight loss, malaise/fatigue. HEENT: Negative for nosebleeds, vision changes. Respiratory: neg hemoptysis, sputum production, and wheezing. Cardiovascular: Negative for chest pain, palpitations, orthopnea, claudication, leg swelling, syncope, and PND. + chronic intermittent dizziness/vertigo  Gastrointestinal: Negative for nausea, vomiting, diarrhea, constipation, blood in stool and melena. Genitourinary: Negative for dysuria, and hematuria. Musculoskeletal: Negative for myalgias, + arthralgia knee and shoulner. Skin: Negative for rash. Heme: Does not bleed or bruise easily.    Neurological: Negative for speech change and focal weakness   No falls     Objective:     Visit Vitals  /60 (BP 1 Location: Left arm, BP Patient Position: Sitting)   Pulse 60   Resp 16   Ht 5' 10\" (1.778 m)   Wt 173 lb (78.5 kg)   SpO2 98%   BMI 24.82 kg/m²       Physical Exam:   Constitutional: Well-developed and well-nourished. No distress. Head: Normocephalic and atraumatic. Eyes: Pupils are equal, round  Neck: Supple. No JVD present. Cardiovascular: Normal rate, regular rhythm. Exam reveals no gallop and no friction rub. No murmur heard. Pulmonary/Chest: No wheezes. Abdominal: Soft, no tenderness. Musculoskeletal: No edema. Neurological: Alert,oriented. Skin: Skin is warm and dry. Right chest pacemaker site well healed. Psychiatric: Normal mood and affect. Behavior is normal. Judgment and thought content normal.      Assessment/Plan:       ICD-10-CM ICD-9-CM    1. Cardiac pacemaker in situ Z95.0 V45.01    2. PAT (paroxysmal atrial tachycardia) ([facility]) I47.1 427.0    3. Atrial flutter, paroxysmal ([facility]) I48.92 427.32    4. Chronic anticoagulation Z79.01 V58.61    5. Mobitz type II atrioventricular block I44.1 426.12      Device check today showed proper lead & generator function. Generator longevity estimated 9 yrs, 8 mo. RA 94%, RV 99.78%. Since 08/28/2019, 15 episodes AT (4-76 seconds). No NSVT since last check. Asymptomatic with atrial tachycardia. No NSVT noted since 02/2019. Last stress test in 2016 normal.    Chronic vertigo, but no falls. For now, ok to continue Eliquis for embolic CVA prophylaxis. Remote pacemaker checks q 3 months. EP clinic follow up in 1 year.      Future Appointments   Date Time Provider Katherin Latanya   3/30/2020  9:15 AM REMOTE1, [address]   7/6/2020  8:00 AM REMOTE1, [address]   10/7/2020  8:00 AM REMOTE1, [address]   1/7/2021  9:30 AM PACEMAKER3, [address]   1/7/2021  9:40 AM Jacquie Louis  [address]       Thank you for involving me in this patient's care and please call with further concerns or questions. Esequiel Brown M.D.   Electrophysiology/Cardiology  [facility]  [facility] [address], [address] [place] [address]  [phone]

## 2020-03-30 ENCOUNTER — OFFICE VISIT (OUTPATIENT)
Dept: CARDIOLOGY CLINIC | Age: 85
End: 2020-03-30

## 2020-03-30 DIAGNOSIS — Z95.0 CARDIAC PACEMAKER IN SITU: Primary | ICD-10-CM

## 2020-04-24 RX ORDER — APIXABAN 5 MG/1
TABLET, FILM COATED ORAL
Qty: 180 TAB | Refills: 1 | Status: SHIPPED | OUTPATIENT
Start: 2020-04-24 | End: 2020-09-24

## 2020-07-06 ENCOUNTER — OFFICE VISIT (OUTPATIENT)
Dept: CARDIOLOGY CLINIC | Age: 85
End: 2020-07-06

## 2020-07-06 DIAGNOSIS — Z95.0 CARDIAC PACEMAKER IN SITU: Primary | ICD-10-CM

## 2020-09-24 RX ORDER — APIXABAN 5 MG/1
TABLET, FILM COATED ORAL
Qty: 180 TAB | Refills: 3 | Status: SHIPPED | OUTPATIENT
Start: 2020-09-24 | End: 2021-09-29

## 2020-10-07 ENCOUNTER — OFFICE VISIT (OUTPATIENT)
Dept: CARDIOLOGY CLINIC | Age: 85
End: 2020-10-07
Payer: MEDICARE

## 2020-10-07 DIAGNOSIS — Z95.0 CARDIAC PACEMAKER IN SITU: Primary | ICD-10-CM

## 2020-10-07 PROCEDURE — 93296 REM INTERROG EVL PM/IDS: CPT | Performed by: INTERNAL MEDICINE

## 2020-10-07 PROCEDURE — 93294 REM INTERROG EVL PM/LDLS PM: CPT | Performed by: INTERNAL MEDICINE

## 2021-01-07 ENCOUNTER — OFFICE VISIT (OUTPATIENT)
Dept: CARDIOLOGY CLINIC | Age: 86
End: 2021-01-07
Payer: MEDICARE

## 2021-01-07 ENCOUNTER — CLINICAL SUPPORT (OUTPATIENT)
Dept: CARDIOLOGY CLINIC | Age: 86
End: 2021-01-07
Payer: MEDICARE

## 2021-01-07 VITALS
SYSTOLIC BLOOD PRESSURE: 116 MMHG | RESPIRATION RATE: 16 BRPM | HEIGHT: 70 IN | BODY MASS INDEX: 24.91 KG/M2 | HEART RATE: 70 BPM | DIASTOLIC BLOOD PRESSURE: 74 MMHG | WEIGHT: 174 LBS

## 2021-01-07 DIAGNOSIS — Z79.01 CHRONIC ANTICOAGULATION: ICD-10-CM

## 2021-01-07 DIAGNOSIS — I48.21 ATRIAL FIBRILLATION, PERMANENT (HCC): ICD-10-CM

## 2021-01-07 DIAGNOSIS — E78.5 HYPERLIPIDEMIA, UNSPECIFIED HYPERLIPIDEMIA TYPE: ICD-10-CM

## 2021-01-07 DIAGNOSIS — Z95.0 CARDIAC PACEMAKER IN SITU: Primary | ICD-10-CM

## 2021-01-07 PROCEDURE — G8427 DOCREV CUR MEDS BY ELIG CLIN: HCPCS | Performed by: INTERNAL MEDICINE

## 2021-01-07 PROCEDURE — 99214 OFFICE O/P EST MOD 30 MIN: CPT | Performed by: INTERNAL MEDICINE

## 2021-01-07 PROCEDURE — 1101F PT FALLS ASSESS-DOCD LE1/YR: CPT | Performed by: INTERNAL MEDICINE

## 2021-01-07 PROCEDURE — G8432 DEP SCR NOT DOC, RNG: HCPCS | Performed by: INTERNAL MEDICINE

## 2021-01-07 PROCEDURE — 93280 PM DEVICE PROGR EVAL DUAL: CPT | Performed by: INTERNAL MEDICINE

## 2021-01-07 PROCEDURE — G8536 NO DOC ELDER MAL SCRN: HCPCS | Performed by: INTERNAL MEDICINE

## 2021-01-07 PROCEDURE — G8420 CALC BMI NORM PARAMETERS: HCPCS | Performed by: INTERNAL MEDICINE

## 2021-01-07 NOTE — PROGRESS NOTES
Cardiac Electrophysiology Office Note     Subjective:      Tad Magallon is an 80 y.o. man who is here today for follow up, is s/p [employer]. Marbin dual chamber pacemaker (DOI 08/03/2016, leads 05/13/2009). Device check today showed proper lead & generator function. Generator longevity estimated 10 years  He is in atrial fibrillation and always RV pacing     He denies chest pain, palpitations, SOB, PND, orthopnea, syncope, or edema. Asymptomatic with AT. Anticoagulated with Eliquis, denies bleeding issues. Previous:  NSVT (02/06/2019), 2 seconds, 202 bpm.    Lexiscan cardiolite stress (09/13/2016): LVEF 64%, no ischemia. Echo (07/29/2016): LVEF 60-65%, no RWMA. RV size at upper limits of normal.  Mild MAXIME. Mild MAC, mild MR. Mild TR. PASP 38 mmHg. Pacemaker initially implanted by Dr. Nancyann Cranker. Atrial flutter ablation had been recommended by Dr. Ki Ron, but patient sought out 2nd opinion. Given lack of symptoms, ablation was not recommended by me. Problem List  Date Reviewed: 11/21/2019          Codes Class Noted    Atrial flutter, paroxysmal ([facility] ICD-10-CM: I48.92  ICD-9-CM: 427.32  9/30/2015        Anticoagulated ICD-10-CM: Z79.01  ICD-9-CM: V58.61  9/30/2015        Pacemaker ICD-10-CM: Z95.0  ICD-9-CM: V45.01  9/30/2015        Paroxysmal ventricular tachycardia ([facility].) ICD-10-CM: I47.2  ICD-9-CM: 427.1  8/28/2012        Other and unspecified hyperlipidemia ICD-10-CM: E78.5  ICD-9-CM: 272.4  8/28/2012        Arrhythmia Sinus Bradycardia ICD-10-CM: I49.5  ICD-9-CM: 427.81  Unknown        Mobitz type II atrioventricular block ICD-10-CM: I44.1  ICD-9-CM: 426.12  Unknown              Current Outpatient Medications   Medication Sig Dispense Refill    Eliquis 5 mg tablet TAKE 1 TABLET BY MOUTH  TWICE DAILY 180 Tab 3    simvastatin (ZOCOR) 40 mg tablet Take 1 Tab by mouth nightly.       acetaminophen (TYLENOL EXTRA STRENGTH) 500 mg tablet Take 1,000 mg by mouth every six (6) hours as needed for Pain. No Known Allergies  Past Medical History:   Diagnosis Date    Arrhythmia Sinus Bradycardia     DJD (degenerative joint disease) of cervical spine     DJD (degenerative joint disease) of hip     Hyperlipidemia     Ill-defined condition 09/2016    Pneumonia    Mobitz (type) II atrioventricular block     Pacemaker      Past Surgical History:   Procedure Laterality Date    GEN INSERT/REPLACE ONLY DUAL  8/3/2016         HX HERNIA REPAIR Right     Inguinal Hernia    HX KNEE REPLACEMENT Left 07/2016    Partial Knee Replacement    HX MOHS PROCEDURES      HX PACEMAKER  2008 & 2016    Right Upper Chest    HX ROTATOR CUFF REPAIR Right 1998 & 2003    HX UROLOGICAL      TURP-for BPH     No family history of pacemaker     Social History     Tobacco Use    Smoking status: Never Smoker    Smokeless tobacco: Never Used   Substance Use Topics    Alcohol use: No     Alcohol/week: 0.0 standard drinks        Review of Systems:   Constitutional: Negative for fever, chills, weight loss, malaise/fatigue. HEENT: Negative for nosebleeds, vision changes. Respiratory: neg hemoptysis, sputum production, and wheezing. Cardiovascular: Negative for chest pain, palpitations, orthopnea, claudication, leg swelling, syncope, and PND. + chronic intermittent dizziness/vertigo  Gastrointestinal: Negative for nausea, vomiting, diarrhea, constipation, blood in stool and melena. Genitourinary: Negative for dysuria, and hematuria. Musculoskeletal: Negative for myalgias, + arthralgia knee    Heme: Does not bleed or bruise easily. Neurological: Negative for speech change and focal weakness   No falls     Objective:     Visit Vitals  /74 (BP 1 Location: Left arm, BP Patient Position: Sitting)   Pulse 70   Resp 16   Ht 5' 10\" (1.778 m)   Wt 174 lb (78.9 kg)   BMI 24.97 kg/m²       Physical Exam:   Constitutional: Well-developed and well-nourished. No distress. Head: Normocephalic and atraumatic.    Eyes: Pupils are equal, round  Neck: Supple. No JVD present. Cardiovascular: Normal rate, regular rhythm. Exam reveals no gallop and no friction rub. No murmur heard. Pulmonary/Chest: No wheezes. Abdominal: Soft, no tenderness. Musculoskeletal: No edema. Neurological: Alert,oriented. Skin: Skin is warm and dry. Right chest pacemaker site well healed. Psychiatric: Normal mood and affect. Behavior is normal. Judgment and thought content normal.      Assessment/Plan:       ICD-10-CM ICD-9-CM    1. Cardiac pacemaker in situ  Z95.0 V45.01    2. Chronic anticoagulation  Z79.01 V58.61    3. Atrial fibrillation, permanent (HCC)  I48.21 427.31    4. Hyperlipidemia, unspecified hyperlipidemia type  E78.5 272.4      Device check today showed proper lead & generator function. Generator longevity estimated 10 yrs  AFIB 100%  No more dizziness  BP controlled and not on med  zocor does not cause myalgia  Labs checked annually with PCP  AFIB on eliquis no bleeding     Remote pacemaker checks q 3 months. EP clinic follow up in 1 year. Future Appointments   Date Time Provider Katherin Pelaez   4/21/2021  2:45 PM REMOTE1, Majo MARTINEZ BS AMB   7/26/2021  3:00 PM REMOTE1, MONCHO LAZO AMB   11/1/2021 10:30 AM REMOTE1, MONCHO LAZO AMB   2/1/2022  9:20 AM PACEMAKER3, MONCHO LAZO AMB   2/1/2022  9:40 AM MD JUAN Gutierrez BS AMB       Thank you for involving me in this patient's care and please call with further concerns or questions. Dina Nelson M.D.   Electrophysiology/Cardiology  [facility] [place]  [address], [address] , [facility] [address]  [address]  [phone]

## 2021-04-21 ENCOUNTER — OFFICE VISIT (OUTPATIENT)
Dept: CARDIOLOGY CLINIC | Age: 86
End: 2021-04-21
Payer: MEDICARE

## 2021-04-21 DIAGNOSIS — Z95.0 CARDIAC PACEMAKER IN SITU: Primary | ICD-10-CM

## 2021-04-21 PROCEDURE — 93296 REM INTERROG EVL PM/IDS: CPT | Performed by: INTERNAL MEDICINE

## 2021-04-21 PROCEDURE — 93294 REM INTERROG EVL PM/LDLS PM: CPT | Performed by: INTERNAL MEDICINE

## 2021-05-14 ENCOUNTER — DOCUMENTATION ONLY (OUTPATIENT)
Dept: CARDIOLOGY CLINIC | Age: 86
End: 2021-05-14

## 2021-05-14 ENCOUNTER — TELEPHONE (OUTPATIENT)
Dept: CARDIOLOGY CLINIC | Age: 86
End: 2021-05-14

## 2021-05-14 DIAGNOSIS — I48.21 ATRIAL FIBRILLATION, PERMANENT (HCC): Primary | ICD-10-CM

## 2021-05-14 DIAGNOSIS — Z95.0 CARDIAC PACEMAKER IN SITU: ICD-10-CM

## 2021-05-14 NOTE — TELEPHONE ENCOUNTER
Received alert for HVR on 05/13/2021 during AF x 2 seconds with max V rate 187 bpm.    Has had similar, faster NSVT previously. Lexiscan cardiolite stress test in 09/2016 showed no ischemia. Echo in 2016 showed normal LVEF. Chronically RV paced. Please schedule echo to occur on same day as his appt with Dr. Una Pierre in 02/2022. Continue to monitor. Should NSVT increase in frequency or duration, would consider adding beta blocker, but not currently necessary.

## 2021-05-14 NOTE — TELEPHONE ENCOUNTER
Verified patient with two types of identifiers. Spoke with patient's wife, verified on HIPAA, and notified of recent device findings and recommendations. Scheduled patient for Echo same day. Patient's wife verbalized understanding and will call with any other questions.       Future Appointments   Date Time Provider Katherin Pelaez   7/26/2021  3:00 PM REMOTE1, MONCHO LAZO AMB   11/1/2021 10:30 AM REMOTE1, MONCHO LAZO AMB   2/1/2022  9:00 AM ECHO, MONCHO LAZO AMB   2/1/2022  9:30 AM PACEMAKER3, MONCHO LAZO AMB   2/1/2022  9:40 AM MD JUAN Wang AMB

## 2021-06-02 ENCOUNTER — TELEPHONE (OUTPATIENT)
Dept: CARDIOLOGY CLINIC | Age: 86
End: 2021-06-02

## 2021-06-02 DIAGNOSIS — R42 LIGHTHEADEDNESS: Primary | ICD-10-CM

## 2021-06-02 NOTE — TELEPHONE ENCOUNTER
Patient requesting recommendation to see specialist for vertigo and lightheadedness.     Phone:  [phone]

## 2021-06-03 NOTE — TELEPHONE ENCOUNTER
Called pt two patient identifiers confirmed. Notified pt about Dr. Gorge Easton recommendations. Pt verbalized understanding of information discussed w/ no further questions at this time.

## 2021-07-26 ENCOUNTER — OFFICE VISIT (OUTPATIENT)
Dept: CARDIOLOGY CLINIC | Age: 86
End: 2021-07-26
Payer: MEDICARE

## 2021-07-26 DIAGNOSIS — Z95.0 CARDIAC PACEMAKER IN SITU: Primary | ICD-10-CM

## 2021-07-26 PROCEDURE — 93296 REM INTERROG EVL PM/IDS: CPT | Performed by: INTERNAL MEDICINE

## 2021-07-26 NOTE — LETTER
7/26/2021 8:16 AM    Mr. Yaz Steele  [address] [facility] [address] [address]            After careful review of your remote check of your implated device on 6-20-42. I have concluded that your device is working properly. Your next:                Automatic remote check ( from home) is scheduled for  11-1-21                             If you have any questions, please call [address] at [phone].      Additional Comments: ________________________________________________    __________________________________________________________________    __________________________________________________________________              Sonia Perez MD [facility]

## 2021-07-26 NOTE — LETTER
2021 8:16 AM    Mr. Yani Moran  [address] [facility] [address]      Dear Patient,    This letter is to inform you that as of  [facility] will no longer be sending out confirmation letters for remote transmissions through the [employer]. All letters in the future will be posted in an electronic medical records format therefore we highly encourage enrollment in [employer] patient's portal. Once enrolled you will have access to any letters we send as well as appointment information that can be found in your medical record. Our EP team would contact you via phone if there are significant abnormal findings so you can discuss with Dr. Mary Gonzalez further in office. Missed transmission letters will also be digitized in [employer] but we will continue to send those out through the mail as well. How to register for [employer]:    - Visit [email or web address]  - Click Create an Account  - Provide your name, address, and   - You will then be asked a short series of questions to verify your identity. This helps to ensure that no one else can access your information.   - If you have any further questions, please contact our office at [phone]. If you choose not to enroll in [employer] or do not have internet access, please kindly let device clinic know and we will accommodate your preference. We are grateful for your understanding and looking forward to this new, improved and more efficient way of communication.            Warm Regards,  [facility] Staff normal...

## 2021-09-29 RX ORDER — APIXABAN 5 MG/1
TABLET, FILM COATED ORAL
Qty: 180 TABLET | Refills: 3 | Status: SHIPPED | OUTPATIENT
Start: 2021-09-29 | End: 2022-10-03

## 2021-09-29 NOTE — TELEPHONE ENCOUNTER
Received refill request for Eliquis 5 mg po tabs. Refill authorized.     Future Appointments   Date Time Provider Katherin Latanya   11/1/2021 10:30 AM REMOTE1, MONCHO LAZO AMB   2/1/2022  9:00 AM ECHOMONCHO AMB   2/1/2022  9:30 AM PACEMAKER3, MONCHO LAZO AMB   2/1/2022  9:40 AM MD JUAN Olivas AMB

## 2021-11-01 ENCOUNTER — OFFICE VISIT (OUTPATIENT)
Dept: CARDIOLOGY CLINIC | Age: 86
End: 2021-11-01
Payer: MEDICARE

## 2021-11-01 DIAGNOSIS — Z95.0 CARDIAC PACEMAKER IN SITU: Primary | ICD-10-CM

## 2021-11-01 PROCEDURE — 93296 REM INTERROG EVL PM/IDS: CPT | Performed by: INTERNAL MEDICINE

## 2021-11-01 PROCEDURE — 93294 REM INTERROG EVL PM/LDLS PM: CPT | Performed by: INTERNAL MEDICINE

## 2021-11-01 NOTE — LETTER
11/1/2021 10:32 AM    Mr. Dorsey File  [address] [facility] [address] [address]            This letter confirms that we have received your scheduled remote check of your implanted     device on 11-1-21  . Our EP team will contact you via phone if there are significant abnormal    findings. Your next in-clinic device check is scheduled for 2-1-22 at 9:20am  .                   If you have any questions, please call [address] [place] [address] at [phone].                Sincerely,    Darrion Payan MD [facility]

## 2022-01-18 NOTE — PROGRESS NOTES
Cardiac Electrophysiology Office Note Subjective:  
  
Polo Galan is an 80 y.o. man who is here today for follow up, is s/p [employer]bin dual chamber pacemaker (DOI 08/03/2016, leads 05/13/2009). Device check today showed proper lead & generator function. Generator longevity estimated 9 yrs, 8 mo. RA 94%, RV 99.78%. Since 08/28/2019, 15 episodes AT (4-76 seconds). No NSVT since last check. His main complaint is frequent vertigo, but he denies any falls. He does states that right knee also gives out occasionally. He denies chest pain, palpitations, SOB, PND, orthopnea, syncope, or edema. Asymptomatic with AT. BP well controlled, 120/60. Anticoagulated with Eliquis, denies bleeding issues. Previous: 
NSVT (02/06/2019), 2 seconds, 202 bpm. 
 
Lexiscan cardiolite stress (09/13/2016): LVEF 64%, no ischemia. Echo (07/29/2016): LVEF 60-65%, no RWMA. RV size at upper limits of normal.  Mild MAXIME. Mild MAC, mild MR. Mild TR. PASP 38 mmHg. Pacemaker initially implanted by Dr. Cher Ricketts. Atrial flutter ablation had been recommended by Dr. Genie Hernandez, but patient sought out 2nd opinion. Given lack of symptoms, ablation was not recommended by me. Patient Active Problem List  
Diagnosis Code  Arrhythmia Sinus Bradycardia I49.5  Mobitz type II atrioventricular block I44.1  Paroxysmal ventricular tachycardia (HCC) I47.2  Other and unspecified hyperlipidemia E78.5  Atrial flutter, paroxysmal (HCC) I48.92  
 Anticoagulated Z79.01  
 Pacemaker Z95.0 Current Outpatient Medications Medication Sig Dispense Refill  ELIQUIS 5 mg tablet TAKE 1 TABLET BY MOUTH TWO  TIMES DAILY 180 Tab 1  
 simvastatin (ZOCOR) 40 mg tablet Take 1 Tab by mouth nightly.  acetaminophen (TYLENOL EXTRA STRENGTH) 500 mg tablet Take 1,000 mg by mouth every six (6) hours as needed for Pain. No Known Allergies Past Medical History:  
Diagnosis Date  Arrhythmia Sinus Bradycardia  DJD (degenerative joint disease) of cervical spine  DJD (degenerative joint disease) of hip  Hyperlipidemia  Ill-defined condition 09/2016 Pneumonia  Mobitz (type) II atrioventricular block  Pacemaker Past Surgical History:  
Procedure Laterality Date  GEN INSERT/REPLACE ONLY DUAL  8/3/2016  HX HERNIA REPAIR Right Inguinal Hernia  HX KNEE REPLACEMENT Left 07/2016 Partial Knee Replacement  HX MOHS PROCEDURES    
 HX PACEMAKER  2008 & 2016 Right Upper Chest  
 HX ROTATOR CUFF REPAIR Right 1998 & 2003  HX UROLOGICAL    
 TURP-for BPH No family history of pacemaker Social History Tobacco Use  Smoking status: Never Smoker  Smokeless tobacco: Never Used Substance Use Topics  Alcohol use: No  
  Alcohol/week: 0.0 standard drinks Review of Systems:  
Constitutional: Negative for fever, chills, weight loss, malaise/fatigue. HEENT: Negative for nosebleeds, vision changes. Respiratory: neg hemoptysis, sputum production, and wheezing. Cardiovascular: Negative for chest pain, palpitations, orthopnea, claudication, leg swelling, syncope, and PND. + chronic intermittent dizziness/vertigo Gastrointestinal: Negative for nausea, vomiting, diarrhea, constipation, blood in stool and melena. Genitourinary: Negative for dysuria, and hematuria. Musculoskeletal: Negative for myalgias, + arthralgia knee and shoulner. Skin: Negative for rash. Heme: Does not bleed or bruise easily. Neurological: Negative for speech change and focal weakness Objective:  
 
Visit Vitals /60 (BP 1 Location: Left arm, BP Patient Position: Sitting) Pulse 60 Resp 16 Ht 5' 10\" (1.778 m) Wt 173 lb (78.5 kg) SpO2 98% BMI 24.82 kg/m² Physical Exam:  
Constitutional: Well-developed and well-nourished. No distress. Head: Normocephalic and atraumatic. Eyes: Pupils are equal, round Neck: Supple. No JVD present. Cardiovascular: Normal rate, regular rhythm. Exam reveals no gallop and no friction rub. No murmur heard. Pulmonary/Chest: No wheezes. Abdominal: Soft, no tenderness. Musculoskeletal: No edema. Neurological: Alert,oriented. Skin: Skin is warm and dry. Right chest pacemaker site well healed. Psychiatric: Normal mood and affect. Behavior is normal. Judgment and thought content normal.   
 
Assessment/Plan: ICD-10-CM ICD-9-CM 1. Cardiac pacemaker in situ Z95.0 V45.01   
2. PAT (paroxysmal atrial tachycardia) ([facility]) I47.1 427.0 3. Atrial flutter, paroxysmal (HCC) I48.92 427.32   
4. Chronic anticoagulation Z79.01 V58.61   
5. 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 Generator longevity estimated 9 yrs, 8 mo. RA 94%, RV 99.78%. Since 08/28/2019, 15 episodes AT (4-76 seconds). No NSVT since last check. Asymptomatic with atrial tachycardia. No NSVT noted since 02/2019. Last stress test in 2016 normal. 
 
Chronic vertigo, but no falls. Advised patient that if he begins to have falls, would have to reassess whether anticoagulant was safe. For now, ok to continue Eliquis for embolic CVA prophylaxis. Remote pacemaker checks q 3 months. EP clinic follow up in 1 year. Future Appointments Date Time Provider Katherin Pelaez 3/30/2020  9:15 AM REMOTE1, MONCHO BADILLO SCHED  
7/6/2020  8:00 AM REMOTE1, MONCHO MARTINEZ ABY SCHED  
10/7/2020  8:00 AM REMOTE1, [address]  
1/7/2021  9:30 AM PACEMAKER3, [address]  
1/7/2021  9:40 AM Barry Morrison  [address] Thank you for involving me in this patient's care and please call with further concerns or questions. Yosef Shaffer M.D. Electrophysiology/Cardiology [address] [place] [address] [facility] [place] [address], [address] [place] [address] [address], [address] [place] [address] 
[phone] good balance MOLECULAR PCR

## 2022-02-01 ENCOUNTER — CLINICAL SUPPORT (OUTPATIENT)
Dept: CARDIOLOGY CLINIC | Age: 87
End: 2022-02-01
Payer: MEDICARE

## 2022-02-01 ENCOUNTER — ANCILLARY PROCEDURE (OUTPATIENT)
Dept: CARDIOLOGY CLINIC | Age: 87
End: 2022-02-01

## 2022-02-01 ENCOUNTER — OFFICE VISIT (OUTPATIENT)
Dept: CARDIOLOGY CLINIC | Age: 87
End: 2022-02-01

## 2022-02-01 VITALS
BODY MASS INDEX: 24.34 KG/M2 | WEIGHT: 170 LBS | HEIGHT: 70 IN | DIASTOLIC BLOOD PRESSURE: 62 MMHG | HEART RATE: 70 BPM | SYSTOLIC BLOOD PRESSURE: 126 MMHG

## 2022-02-01 DIAGNOSIS — Z95.0 CARDIAC PACEMAKER IN SITU: Primary | ICD-10-CM

## 2022-02-01 DIAGNOSIS — I48.21 ATRIAL FIBRILLATION, PERMANENT (HCC): ICD-10-CM

## 2022-02-01 DIAGNOSIS — I44.1 2ND DEGREE AV BLOCK: ICD-10-CM

## 2022-02-01 DIAGNOSIS — Z95.0 CARDIAC PACEMAKER IN SITU: ICD-10-CM

## 2022-02-01 DIAGNOSIS — Z79.01 CHRONIC ANTICOAGULATION: ICD-10-CM

## 2022-02-01 DIAGNOSIS — I48.92 ATRIAL FLUTTER WITH CONTROLLED RESPONSE (HCC): ICD-10-CM

## 2022-02-01 PROCEDURE — G8427 DOCREV CUR MEDS BY ELIG CLIN: HCPCS | Performed by: INTERNAL MEDICINE

## 2022-02-01 PROCEDURE — G8536 NO DOC ELDER MAL SCRN: HCPCS | Performed by: INTERNAL MEDICINE

## 2022-02-01 PROCEDURE — 93306 TTE W/DOPPLER COMPLETE: CPT | Performed by: INTERNAL MEDICINE

## 2022-02-01 PROCEDURE — G8432 DEP SCR NOT DOC, RNG: HCPCS | Performed by: INTERNAL MEDICINE

## 2022-02-01 PROCEDURE — 99214 OFFICE O/P EST MOD 30 MIN: CPT | Performed by: INTERNAL MEDICINE

## 2022-02-01 PROCEDURE — 1101F PT FALLS ASSESS-DOCD LE1/YR: CPT | Performed by: INTERNAL MEDICINE

## 2022-02-01 PROCEDURE — G8420 CALC BMI NORM PARAMETERS: HCPCS | Performed by: INTERNAL MEDICINE

## 2022-02-01 PROCEDURE — 93280 PM DEVICE PROGR EVAL DUAL: CPT | Performed by: INTERNAL MEDICINE

## 2022-02-01 NOTE — PROGRESS NOTES
Cardiac Electrophysiology Office Note     Subjective:       Seferino Hong is an 80 y. o.man who is here today for follow up, is s/p St. Marbin dual chamber pacemaker (DOI 08/03/2016, leads 05/13/2009). Echo pending today. He reports vertigo, but denies any associated falls.  He denies chest pain, SOB, palpitations, PND, orthopnea, or lower extremity edema. BP controlled.       Anticoagulated with Eliquis, denies bleeding issues. Previous:   NSVT (02/06/2019), 2 seconds, 202 bpm.     St. 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  Pacemaker initially implanted by Dr. Vanessa Lester. Atrial flutter ablation had been recommended by Dr. Betsey De La Paz, but patient sought out 2nd opinion. Daly Castillo lack of symptoms, ablation was not recommended by me. Problem List     Atrial flutter, paroxysmal ([facility] ICD-10-CM: I48.92   ICD-9-CM: 427.32 9/30/2015     Anticoagulated ICD-10-CM: Z79.01   ICD-9-CM: V58.61 9/30/2015     Pacemaker ICD-10-CM: Z95.0   ICD-9-CM: V45.01 9/30/2015     Paroxysmal ventricular tachycardia (HCC) ICD-10-CM: I47.2   ICD-9-CM: 427.1 8/28/2012     Other and unspecified hyperlipidemia ICD-10-CM: E78.5   ICD-9-CM: 272.4 8/28/2012     Arrhythmia Sinus Bradycardia ICD-10-CM: I49.5   ICD-9-CM: 427.81 Unknown     Mobitz type II atrioventricular block ICD-10-CM: I44.1   ICD-9-CM: 426.12 Unknown       Current Outpatient Medications   Medication Sig Dispense Refill   · Eliquis 5 mg tablet TAKE 1 TABLET BY MOUTH  TWICE DAILY 180 Tablet 3   · simvastatin (ZOCOR) 40 mg tablet Take 1 Tab by mouth nightly. · acetaminophen (TYLENOL EXTRA STRENGTH) 500 mg tablet Take 1,000 mg by mouth every six (6) hours as needed for Pain.      No Known Allergies   Past Medical History:   Diagnosis Date   · Arrhythmia Sinus Bradycardia   · DJD (degenerative joint disease) of cervical spine   · DJD (degenerative joint disease) of hip   · Hyperlipidemia   · Ill-defined condition 09/2016 Pneumonia   · Mobitz (type) II atrioventricular block   · Pacemaker     Past Surgical History:   Procedure Laterality Date   · GEN INSERT/REPLACE ONLY DUAL 8/3/2016     · HX HERNIA REPAIR Right   Inguinal Hernia   · HX KNEE REPLACEMENT Left 07/2016   Partial Knee Replacement   · HX MOHS PROCEDURES   · HX PACEMAKER 2008 & 2016   Right Upper Chest   · HX ROTATOR CUFF REPAIR Right 1998 & 2003   · HX UROLOGICAL   TURP-for BPH     No family history of pacemaker     Social History     Tobacco Use   · Smoking status: Never Smoker   · Smokeless tobacco: Never Used   Substance Use Topics   · Alcohol use: No   Alcohol/week: 0.0 standard drinks         Review of Systems:   Constitutional: Negative for fever, chills, weight loss, malaise/fatigue. HEENT: Negative for nosebleeds, vision changes. Respiratory: neg hemoptysis, sputum production, and wheezing. Cardiovascular: Negative for chest pain, palpitations, orthopnea, claudication, leg swelling, syncope, and PND. + chronic intermittent dizziness/vertigo   Gastrointestinal: Negative for nausea, vomiting, diarrhea, constipation, blood in stool and melena. Genitourinary: Negative for dysuria, and hematuria. Musculoskeletal: Negative for myalgias, + arthralgia knee     Heme: Does not bleed or bruise easily. Neurological: Negative for speech change and focal weakness.  No falls.  + vertigo.       Objective:   Visit Vitals  /62   Pulse 70   Ht 5' 10\" (1.778 m)   Wt 170 lb (77.1 kg)   BMI 24.39 kg/m²          Physical Exam:   Constitutional: Well-developed and well-nourished. No distress. Head: Normocephalic and atraumatic. Eyes: Pupils are equal, round   Neck: Supple. No JVD present. Cardiovascular: Normal rate, regular rhythm.  Exam reveals no gallop and no friction rub.  No murmur heard. Pulmonary/Chest: No wheezes. Abdominal: Soft, no tenderness. Musculoskeletal: Moves extremities independently.  Normal gait. Vasc/lymphatic: No edema. Neurological: Alert, oriented. Skin: Skin is warm and dry. Right chest pacemaker site well healed. Psychiatric: Normal mood and affect. Behavior is normal. Judgment and thought content normal.       Assessment/Plan:     Imaging/Studies:   Lexiscan cardiolite stress (09/13/2016): LVEF 64%, no ischemia. Echo (07/29/2016): LVEF 60-65%, no RWMA.  RV size at upper limits of normal.  Mild MAXIME.  Mild MAC, mild MR.  Mild TR.  PASP 38 mmHg. ICD-10-CM ICD-9-CM    1. Cardiac pacemaker in situ  Z95.0 V45.01    2. Chronic anticoagulation  Z79.01 V58.61    3. 2nd degree AV block  I44.1 426.13    4. Atrial flutter with controlled response ([address]  I48.92 427.32            St. Marbin dual chamber pacemaker (gen change 08/03/2016, leads 05/13/2009): Device check today shows proper lead & generator function.  Generator longevity estimated 9.25 yrs.  RA 0%.  RV 99.92%.  Pacer dependent.  AFL burden 100%; will plan to switch to VVIR at next in clinic visit. Last LVEF WNL, repeat echo pending today.  Images not currently available.  Will contact patient once finalized.    Should LVEF decline to <50%, would consider CRT upgrade. AF/AFL: Chronic AFL, controlled ventricular rate.  Asymptomatic.       Anticoagulation: Continue Eliquis for embolic CVA prophylaxis.  Denies bleeding issues.  Cautioned regarding need for head imaging if he has any falls with head trauma.      He has vertigo but not resolved and he is not falling      Remote pacemaker checks q 3 months.  EP clinic follow up in 1 year.       Future Appointments   Date Time Provider Katherin Pelaez   5/18/2022  9:30 AM REMOTE1, MONCHO LAZO AMB   8/22/2022  9:45 AM REMOTE1, MONCHO LAZO AMB   11/28/2022  9:45 AM REMOTE1, MONCHO MARTINEZ BS AMB   3/7/2023 10:00 AM PACEMAKER3, MONCHO LAZO AMB   3/7/2023 10:20 AM MD JUAN Kay BS AMB         Thank you for involving me in this patient's care and please call with further concerns or questions.   Katya Hernandez M.D.    Electrophysiology/Cardiology   [facility] [place]   [facility] [address], [address] [facility] 25 698 [id number] [facility] Gurwinder Tor[facility], [address] [place] [address]   [phone]

## 2022-02-03 ENCOUNTER — TELEPHONE (OUTPATIENT)
Dept: CARDIOLOGY CLINIC | Age: 87
End: 2022-02-03

## 2022-02-03 LAB
ECHO AO ASC DIAM: 3.8 CM
ECHO AO ASCENDING AORTA INDEX: 1.95 CM/M2
ECHO AO ROOT DIAM: 3.1 CM
ECHO AO ROOT INDEX: 1.59 CM/M2
ECHO AV MEAN GRADIENT: 3 MMHG
ECHO AV MEAN VELOCITY: 0.8 M/S
ECHO AV PEAK GRADIENT: 5 MMHG
ECHO AV PEAK VELOCITY: 1.2 M/S
ECHO AV VELOCITY RATIO: 0.75
ECHO AV VTI: 22.3 CM
ECHO EST RA PRESSURE: 5 MMHG
ECHO IVC PROX: 2 CM
ECHO LA DIAMETER INDEX: 2.36 CM/M2
ECHO LA DIAMETER: 4.6 CM
ECHO LA TO AORTIC ROOT RATIO: 1.48
ECHO LV E' LATERAL VELOCITY: 9 CM/S
ECHO LV E' SEPTAL VELOCITY: 6 CM/S
ECHO LV EF PHYSICIAN: 55 %
ECHO LV FRACTIONAL SHORTENING: 26 % (ref 28–44)
ECHO LV INTERNAL DIMENSION DIASTOLE INDEX: 2 CM/M2
ECHO LV INTERNAL DIMENSION DIASTOLIC: 3.9 CM (ref 4.2–5.9)
ECHO LV INTERNAL DIMENSION SYSTOLIC INDEX: 1.49 CM/M2
ECHO LV INTERNAL DIMENSION SYSTOLIC: 2.9 CM
ECHO LV IVSD: 1.7 CM (ref 0.6–1)
ECHO LV MASS 2D: 249 G (ref 88–224)
ECHO LV MASS INDEX 2D: 127.7 G/M2 (ref 49–115)
ECHO LV POSTERIOR WALL DIASTOLIC: 1.5 CM (ref 0.6–1)
ECHO LV RELATIVE WALL THICKNESS RATIO: 0.77
ECHO LVOT AV VTI INDEX: 0.73
ECHO LVOT MEAN GRADIENT: 1 MMHG
ECHO LVOT PEAK GRADIENT: 3 MMHG
ECHO LVOT PEAK VELOCITY: 0.9 M/S
ECHO LVOT VTI: 16.2 CM
ECHO MV REGURGITANT ALIASING (NYQUIST) VELOCITY: 25 CM/S
ECHO MV REGURGITANT VELOCITY PISA: 4.7 M/S
ECHO MV REGURGITANT VTIA: 143.6 CM
ECHO PULMONARY ARTERY SYSTOLIC PRESSURE (PASP): 35 MMHG
ECHO RIGHT VENTRICULAR SYSTOLIC PRESSURE (RVSP): 35 MMHG
ECHO RV TAPSE: 1.9 CM (ref 1.5–2)
ECHO TV REGURGITANT MAX VELOCITY: 2.72 M/S
ECHO TV REGURGITANT PEAK GRADIENT: 30 MMHG

## 2022-02-03 NOTE — TELEPHONE ENCOUNTER
Verified patient with two types of identifiers. Spoke with patient's wife verified on PHI. Notified of results and NP recommendations. Patient verbalized understanding and will call with any other questions.       Future Appointments   Date Time Provider Katherin Pelaez   5/18/2022  9:30 AM REMOTE1, MONCHO LAZO AMB   8/22/2022  9:45 AM REMOTE1, MONCHO LAZO AMB   11/28/2022  9:45 AM REMOTE1, MONCHO LAZO AMB   3/7/2023 10:00 AM PACEMAKER3, MONCHO LAZO AMB   3/7/2023 10:20 AM MD JUAN Camarillo AMB

## 2022-02-03 NOTE — TELEPHONE ENCOUNTER
----- Message from Ellis Catalan NP sent at 2/3/2022  9:34 AM EST -----  LVEF 50-55%, mod concentric LVH. Mild posterior MAC, mild to mod MR. Mild AR, moderately thickened aortic valve cusps. Mod TR. Mild PH. Not a candidate for CRT upgrade, no change in treatment plan based on results.

## 2022-02-03 NOTE — PROGRESS NOTES
LVEF 50-55%, mod concentric LVH. Mild posterior MAC, mild to mod MR. Mild AR, moderately thickened aortic valve cusps. Mod TR. Mild PH. Not a candidate for CRT upgrade, no change in treatment plan based on results.

## 2022-05-18 ENCOUNTER — OFFICE VISIT (OUTPATIENT)
Dept: CARDIOLOGY CLINIC | Age: 87
End: 2022-05-18
Payer: MEDICARE

## 2022-05-18 DIAGNOSIS — Z95.0 CARDIAC PACEMAKER IN SITU: Primary | ICD-10-CM

## 2022-05-18 PROCEDURE — 93294 REM INTERROG EVL PM/LDLS PM: CPT | Performed by: INTERNAL MEDICINE

## 2022-05-18 PROCEDURE — 93296 REM INTERROG EVL PM/IDS: CPT | Performed by: INTERNAL MEDICINE

## 2022-05-18 NOTE — LETTER
5/18/2022 9:07 AM    Mr. Millicent Austin  [address] [address]            This letter confirms that we have received your scheduled remote check of your implanted     device on 5-19-22  . Our EP team will contact you via phone if there are significant abnormal    findings. Your next remote check from home is scheduled for 8-22-22  . If you have any questions, please call [address] [place] [address] Drive at [phone].                Sincerely,    Saniya Gunn MD [facility]

## 2022-08-22 ENCOUNTER — OFFICE VISIT (OUTPATIENT)
Dept: CARDIOLOGY CLINIC | Age: 87
End: 2022-08-22
Payer: MEDICARE

## 2022-08-22 DIAGNOSIS — Z95.0 CARDIAC PACEMAKER IN SITU: Primary | ICD-10-CM

## 2022-08-22 PROCEDURE — 93294 REM INTERROG EVL PM/LDLS PM: CPT | Performed by: INTERNAL MEDICINE

## 2022-08-22 PROCEDURE — 93296 REM INTERROG EVL PM/IDS: CPT | Performed by: INTERNAL MEDICINE

## 2022-08-22 NOTE — LETTER
9/21/2022 12:53 PM    Mr. Serenity Arzate [id number]    Dear Mr. Edin Zaldivar,    We have received your recent remote monitor check of your implanted device on 8/22/22. Your remaining estimated battery life is 3.4 years and your device is working normally & appropriately. Your next remote monitor check is scheduled for 11/28/2022. This is NOT an in-clinic appointment. This transmission is sent from your home monitor. Please make sure your home monitor is plugged into power and within 10 feet of where you sleep. If you are using the phone applications, please make sure it is open on your smart phone. If you have difficulty sending a transmission, please do NOT call our office. Instead, call tech support for your device as they are better able to assist.    [facility])   [phone]    If you have any questions, please call the Pacemaker/ICD clinic that follows you. We appreciate you staying remotely connected!     Sincerely,    Sailaja Lawler RN, BSN  Device Coordinator RN  [facility]  [phone]  [facility]  [phone]

## 2022-10-03 RX ORDER — APIXABAN 5 MG/1
TABLET, FILM COATED ORAL
Qty: 180 TABLET | Refills: 3 | Status: SHIPPED | OUTPATIENT
Start: 2022-10-03

## 2022-10-03 NOTE — TELEPHONE ENCOUNTER
Requested Prescriptions     Signed Prescriptions Disp Refills    Eliquis 5 mg tablet 180 Tablet 3     Sig: TAKE 1 TABLET BY MOUTH  TWICE DAILY     Authorizing Provider: Tammi Valdez     Ordering User: Griselda Hazel     Refills per verbal order from PATRICE Stone NP.    Last visit: 2/1/22  Next visit: 3/7/23

## 2022-11-28 ENCOUNTER — OFFICE VISIT (OUTPATIENT)
Dept: CARDIOLOGY CLINIC | Age: 87
End: 2022-11-28
Payer: MEDICARE

## 2022-11-28 DIAGNOSIS — Z95.0 CARDIAC PACEMAKER IN SITU: Primary | ICD-10-CM

## 2022-11-28 NOTE — PROGRESS NOTES
Scheduled remote transmission. Device functioning appropriately as programmed. See scanned documents.  Chargeable visit

## 2023-05-18 ENCOUNTER — TELEPHONE (OUTPATIENT)
Age: 88
End: 2023-05-18

## 2023-05-18 ENCOUNTER — CLINICAL DOCUMENTATION (OUTPATIENT)
Age: 88
End: 2023-05-18

## 2023-05-18 RX ORDER — FUROSEMIDE 20 MG/1
20 TABLET ORAL DAILY
Qty: 60 TABLET | Refills: 2 | Status: SHIPPED | OUTPATIENT
Start: 2023-05-18

## 2023-05-18 NOTE — PROGRESS NOTES
Echo 5/2023 with normal LVEF 55%, moderate LVH  AFIB   Ventricular pacing  Moderate TR and pulmonary hypertension   Mild MR AR       Recommend lasix 20 mg qd for vascular congestion on xray   Follow up sooner in 1 month with me  Future Appointments   Date Time Provider Ronel Garcia   7/12/2023 10:45 AM REMOTE1, LISS HUA BS AMB   8/4/2023  8:00 AM Terrell Jensen MD NEUROWTCRSPB BS AMB   10/16/2023  8:00 AM REMOTE1, LISS BRITTON AMB   5/8/2024  9:20 AM PACEMAKERMARTHA BS AMB   5/8/2024  9:40 AM Irma De Los Santos MD CAVSF BS AMB

## 2023-05-18 NOTE — TELEPHONE ENCOUNTER
----- Message from Ev Moreno MD sent at 5/18/2023  8:03 AM EDT -----  Echo 5/2023 with normal LVEF 55%, moderate LVH  AFIB   Ventricular pacing  Moderate TR and pulmonary hypertension   Mild MR AR       Recommend lasix 20 mg qd for vascular congestion on xray   Follow up sooner in 1 month with me        Verified patient with two types of identifiers. Informed patient of echo results and Dr. Alexandru Gary recommendations. Informed patient of appointment scheduled for 6/21 at [facility]. Patient verbalized understanding and will call with any other questions.       Future Appointments   Date Time Provider Ronel Garcia   6/21/2023  3:40 PM MD TOM Miller BS AMB   7/12/2023 10:45 AM REMOTE1, LISS HUA BS AMB   8/4/2023  8:00 AM Stanislav Meza MD NEUROWTCRSPB BS AMB   10/16/2023  8:00 AM REMOTE1, LISS HUA BS AMB   5/8/2024  9:20 AM PACEMAKER, STFRANCES TOM BS AMB   5/8/2024  9:40 AM MD TOM Miller BS AMB

## 2023-05-26 RX ORDER — PREDNISONE 10 MG/1
10 TABLET ORAL DAILY
COMMUNITY
Start: 2023-01-19

## 2023-05-26 RX ORDER — SIMVASTATIN 40 MG
1 TABLET ORAL NIGHTLY
COMMUNITY
Start: 2017-05-30

## 2023-05-26 RX ORDER — ACETAMINOPHEN 500 MG
1000 TABLET ORAL EVERY 6 HOURS PRN
COMMUNITY

## 2023-06-21 ENCOUNTER — OFFICE VISIT (OUTPATIENT)
Age: 88
End: 2023-06-21
Payer: COMMERCIAL

## 2023-06-21 VITALS
SYSTOLIC BLOOD PRESSURE: 110 MMHG | WEIGHT: 180 LBS | RESPIRATION RATE: 16 BRPM | HEART RATE: 70 BPM | HEIGHT: 70 IN | DIASTOLIC BLOOD PRESSURE: 60 MMHG | BODY MASS INDEX: 25.77 KG/M2 | OXYGEN SATURATION: 97 %

## 2023-06-21 DIAGNOSIS — R42 DIZZINESS AND GIDDINESS: Primary | ICD-10-CM

## 2023-06-21 DIAGNOSIS — R60.0 LOCALIZED EDEMA: ICD-10-CM

## 2023-06-21 DIAGNOSIS — I44.1 ATRIOVENTRICULAR BLOCK, SECOND DEGREE: ICD-10-CM

## 2023-06-21 DIAGNOSIS — H53.2 DOUBLE VISION: ICD-10-CM

## 2023-06-21 DIAGNOSIS — Z95.0 PRESENCE OF CARDIAC PACEMAKER: ICD-10-CM

## 2023-06-21 DIAGNOSIS — I48.21 PERMANENT ATRIAL FIBRILLATION (HCC): ICD-10-CM

## 2023-06-21 DIAGNOSIS — R42 VERTIGO: ICD-10-CM

## 2023-06-21 PROCEDURE — 4004F PT TOBACCO SCREEN RCVD TLK: CPT | Performed by: INTERNAL MEDICINE

## 2023-06-21 PROCEDURE — G8419 CALC BMI OUT NRM PARAM NOF/U: HCPCS | Performed by: INTERNAL MEDICINE

## 2023-06-21 PROCEDURE — 99214 OFFICE O/P EST MOD 30 MIN: CPT | Performed by: INTERNAL MEDICINE

## 2023-06-21 PROCEDURE — 1123F ACP DISCUSS/DSCN MKR DOCD: CPT | Performed by: INTERNAL MEDICINE

## 2023-06-21 PROCEDURE — G8427 DOCREV CUR MEDS BY ELIG CLIN: HCPCS | Performed by: INTERNAL MEDICINE

## 2023-06-21 RX ORDER — MECLIZINE HYDROCHLORIDE 25 MG/1
25 TABLET ORAL 3 TIMES DAILY
Qty: 90 TABLET | Refills: 5 | Status: SHIPPED | OUTPATIENT
Start: 2023-06-21

## 2023-06-21 RX ORDER — BUMETANIDE 1 MG/1
TABLET ORAL
Qty: 60 TABLET | Refills: 2 | Status: SHIPPED | OUTPATIENT
Start: 2023-06-21

## 2023-06-21 NOTE — PROGRESS NOTES
Cardiac Electrophysiology Office Note     Subjective:       Frieda Hilario is a 80 y. o.man who is here today for follow up, is s/p [employer] dual chamber pacemaker (DOI 08/03/2016, leads 05/13/2009). Echo last month May 2023 reported:       TRANSTHORACIC ECHOCARDIOGRAM (TTE) COMPLETE (CONTRAST/BUBBLE/3D PRN) 05/18/2023  7:58 AM (Final)    Interpretation Summary    Left Ventricle: Normal left ventricular systolic function. EF by 2D Simpsons Biplane is 55%. Left ventricle size is normal. Moderately increased wall thickness. Moderate septal thickening. Mild posterior thickening. Findings consistent with moderate asymmetric hypertrophy. Normal wall motion. Right Ventricle: Right ventricle is mildly dilated. Lead present in the right ventricle. Aortic Valve: Trileaflet valve. Mild sclerosis of the aortic valve cusp. Mild regurgitation. Mitral Valve: Valve structure is normal. Mild annular calcification of the mitral valve. Mild regurgitation. Tricuspid Valve: Moderate regurgitation. Moderately elevated RVSP. The estimated RVSP is 55 mmHg. Left Atrium: Left atrium is moderately dilated. Left atrial volume index is mildly increased (35-41 mL/m2). LA Vol Index A/L is 37 mL/m2. Right Atrium: Right atrium is mildly dilated. Aorta: Normal sized aortic root. Mildly dilated ascending aorta. Ao ascending diameter is 3.8 cm. LVEF normal with atrial fibrillation and pacemaker    Signed by: Williams Oliver MD on 5/18/2023  7:58 AM      He reports vertigo, but denies any associated falls. He denies chest pain, +SOB, +  lower extremity edema. His vision is double   He said his eyes are heavy and \"full\", legs are swollen  He has to take prednisone for arthralgia  He takes lasix but 20 mg qd not effective enough  He saw eye specialist and was told he did not have issue with his eye so he will see Dr Lauren Leroy neurologist next      Anticoagulated with Eliquis, denies bleeding issues.        Previous:   NSVT

## 2023-06-29 DIAGNOSIS — Z95.0 PRESENCE OF CARDIAC PACEMAKER: ICD-10-CM

## 2023-06-29 DIAGNOSIS — I48.21 PERMANENT ATRIAL FIBRILLATION (HCC): ICD-10-CM

## 2023-06-29 DIAGNOSIS — R42 DIZZINESS AND GIDDINESS: ICD-10-CM

## 2023-06-29 DIAGNOSIS — R42 VERTIGO: ICD-10-CM

## 2023-06-29 DIAGNOSIS — R60.0 LOCALIZED EDEMA: ICD-10-CM

## 2023-06-29 DIAGNOSIS — H53.2 DOUBLE VISION: ICD-10-CM

## 2023-06-29 DIAGNOSIS — I44.1 ATRIOVENTRICULAR BLOCK, SECOND DEGREE: ICD-10-CM

## 2023-06-30 LAB
ANION GAP SERPL CALC-SCNC: 6 MMOL/L (ref 5–15)
BUN SERPL-MCNC: 43 MG/DL (ref 6–20)
BUN/CREAT SERPL: 30 (ref 12–20)
CALCIUM SERPL-MCNC: 10.2 MG/DL (ref 8.5–10.1)
CHLORIDE SERPL-SCNC: 102 MMOL/L (ref 97–108)
CO2 SERPL-SCNC: 33 MMOL/L (ref 21–32)
CREAT SERPL-MCNC: 1.42 MG/DL (ref 0.7–1.3)
GLUCOSE SERPL-MCNC: 122 MG/DL (ref 65–100)
POTASSIUM SERPL-SCNC: 4.8 MMOL/L (ref 3.5–5.1)
SODIUM SERPL-SCNC: 141 MMOL/L (ref 136–145)

## 2023-07-03 ENCOUNTER — TELEPHONE (OUTPATIENT)
Age: 88
End: 2023-07-03

## 2023-07-03 DIAGNOSIS — R60.0 LOCALIZED EDEMA: Primary | ICD-10-CM

## 2023-07-03 DIAGNOSIS — I48.21 PERMANENT ATRIAL FIBRILLATION (HCC): ICD-10-CM

## 2023-07-03 NOTE — TELEPHONE ENCOUNTER
Labs received from PCP. In folder for NP review. Recent creatinine:    8/25/22: 1.09  6/14/22: 1.28    Verified patient with two types of identifiers. Notified of results and NP recommendations. Patient states change in medication has helped with the swelling. Legs almost down to baseline. He is due to decrease to 1 mg daily this week. His vertigo has been really difficult this past weekend. He will get updated labs in 3 months. He requested AVS from previous office visit be mailed. Patient verbalized understanding and will call with any other questions.       Future Appointments   Date Time Provider [address] [place] [address]   7/12/2023 10:45 AM REMOTE1, LISS HUA BS AMB   8/4/2023  8:00 AM Lety Murillo MD NEUROWTCRSPB BS AMB   9/27/2023  2:40 PM Mony Bridges MD CAVSF BS AMB   10/16/2023  8:00 AM REMOTE1, LISS HUA BS AMB   5/8/2024  9:20 AM PACEMAKER, STFRJESSICA SANTOS BS AMB   5/8/2024  9:40 AM Mony Bridges MD CAVSF BS AMB

## 2023-07-03 NOTE — TELEPHONE ENCOUNTER
----- Message from NICOL Larsen NP sent at 6/30/2023 10:26 AM EDT -----  Cr mildly elevated, but last labs for reference were in 2017. If he's had other labs drawn elsewhere within the past year, please obtain results for comparison. For now, ok to continue with Bumex. Recommend recheck BMP in 3 months.

## 2023-07-07 ENCOUNTER — CLINICAL DOCUMENTATION (OUTPATIENT)
Age: 88
End: 2023-07-07

## 2023-08-04 ENCOUNTER — PROCEDURE VISIT (OUTPATIENT)
Age: 88
End: 2023-08-04

## 2023-08-04 ENCOUNTER — OFFICE VISIT (OUTPATIENT)
Age: 88
End: 2023-08-04

## 2023-08-04 VITALS
OXYGEN SATURATION: 97 % | BODY MASS INDEX: 25.77 KG/M2 | HEIGHT: 70 IN | HEART RATE: 70 BPM | RESPIRATION RATE: 20 BRPM | WEIGHT: 180 LBS | SYSTOLIC BLOOD PRESSURE: 124 MMHG | DIASTOLIC BLOOD PRESSURE: 58 MMHG

## 2023-08-04 DIAGNOSIS — R42 VERTIGO: Primary | ICD-10-CM

## 2023-08-04 DIAGNOSIS — H53.2 DIPLOPIA: ICD-10-CM

## 2023-08-04 DIAGNOSIS — R42 VERTIGO: ICD-10-CM

## 2023-08-04 LAB — ECHO BSA: 2.01 M2

## 2023-08-04 ASSESSMENT — PATIENT HEALTH QUESTIONNAIRE - PHQ9
SUM OF ALL RESPONSES TO PHQ QUESTIONS 1-9: 0
SUM OF ALL RESPONSES TO PHQ QUESTIONS 1-9: 0
2. FEELING DOWN, DEPRESSED OR HOPELESS: 0
SUM OF ALL RESPONSES TO PHQ QUESTIONS 1-9: 0
1. LITTLE INTEREST OR PLEASURE IN DOING THINGS: 0
SUM OF ALL RESPONSES TO PHQ9 QUESTIONS 1 & 2: 0
SUM OF ALL RESPONSES TO PHQ QUESTIONS 1-9: 0

## 2023-08-04 NOTE — PROGRESS NOTES
[facility] and [address] [facility] Maty Justine at [facility] at [address], [address] [place] [address]  [phone] Office  [phone] Facsimile           Referring: Dr. Daniel Richter  Primary: Renetta Osorio. Chief Complaint   Patient presents with    New Patient     Patient is here for vertigo. Patient reports that it has been going on for about years now. Right at the time covid. He reports having double vision. It has gotten worse everyday. Mr. Alma Melendrez is a 80-year-old gentleman who presents today for initial neurologic consultation regarding vertigo and double vision. He says his symptoms started about 4 years ago. He thinks he had it probably 15 years ago and it went away but he is not sure why. In any regard he notes that he has constant spinning sensation even when he is seated and not moving. In addition he has horizontal diplopia. He feels like his eyes are full and he cannot keep his eyes open. The eyes want to close. His vision has decreased as well. No injury. No inciting factor. He says he is miserable for 12 hours out of the day. He gets up at 7 AM and he goes to bed at 7 PM because he just cannot take the dizzy sensation anymore. He is imbalance. He is short of breath and notes that his difficulty even walking to the mailbox without being short of breath. He has follow-up with cardiology. He was tried with meclizine and that did not help. He was given some Bumex and that helped his edema. He is no longer taking those. He has not had any imaging. He denies any focal weakness. If he closes 1 eye the double vision goes away but he says that he has a hard time seeing. Record review finds office visit with Dr. Lorraine Casillas June 21, 2023. He is status post [address] dual chamber pacemaker. He also has atrial fibrillation. He was reporting vertigo.

## 2023-08-04 NOTE — PATIENT INSTRUCTIONS
[address] [place] [address] Neuroscience Test Result Communication    Test results are available in [employer] is the patient portal into our electronic health record. This feature allows patients to see diagnostic test results, immunizations, allergies, past medical and surgical history, current medications, and send messages directly to providers. Our team members at the  can provide additional information and assist with registration. The [employer] support team can be reached at [phone]. In some cases, a provider might need time to explain the results in detail during a follow-up appointment. This might include additional information or context that will help patients understand the reason for next steps in the plan of care recommended by their provider. If a patient chooses to receive diagnostic testing at an imaging center outside of the [facility], it is the patient's responsibility to bring the imaging report and disc to their [facility] follow-up appointment. If the test results reveal anything that is particularly noteworthy, we will contact you to discuss the matter and, if necessary, schedule a follow-up appointment at an earlier date. If you have not received your test results by [employer] or other communication within 7 days, please contact our office. An inquiry can be sent to your provider using [employer]. Alternatively, appointments can be scheduled via telephone to review results. If a follow-up appointment to review results has not been scheduled, [facility] office can be reached at [phone]. For appointments at our [facility] or [facility] office, please call [phone]. [address] [facility]   Statement to Patients  April 1, 2014      In an effort to ensure the large volume of patient prescription refills is processed in the most efficient and expeditious

## 2023-08-07 ENCOUNTER — PROCEDURE VISIT (OUTPATIENT)
Age: 88
End: 2023-08-07

## 2023-08-07 DIAGNOSIS — H53.2 DIPLOPIA: ICD-10-CM

## 2023-08-07 DIAGNOSIS — R42 VERTIGO: Primary | ICD-10-CM

## 2023-08-12 NOTE — PROCEDURES
[facility]   Electroencephalogram Report    Procedure ID: [id number] Procedure Date: 8/7/2023   Patient Name: Georga Leventhal YOB: 1930   Procedure Type: Routine Medical Record No: [id number]       An EEG is requested in this 77-year-old man to evaluate for epileptiform abnormalities. Medications such include Bumex meclizine Zocor prednisone Eliquis    This tracing is obtained during the awake state. During wakefulness there are brief intermittent runs of posteriorly dominant and symmetric low to medium amplitude 8.5 cps activities which attenuate with eye opening. Lower voltage faster frequency activities are seen symmetrically over the anterior head regions. Hyperventilation is not performed. Intermittent photic stimulation induces symmetric posterior driving responses. Sleep is not attained.     Interpretation  This EEG recorded during the awake state is normal.        Prince Delgado MD

## 2023-09-27 ENCOUNTER — OFFICE VISIT (OUTPATIENT)
Age: 88
End: 2023-09-27
Payer: COMMERCIAL

## 2023-09-27 VITALS
RESPIRATION RATE: 18 BRPM | DIASTOLIC BLOOD PRESSURE: 46 MMHG | HEIGHT: 70 IN | WEIGHT: 176.2 LBS | BODY MASS INDEX: 25.22 KG/M2 | HEART RATE: 80 BPM | OXYGEN SATURATION: 99 % | SYSTOLIC BLOOD PRESSURE: 116 MMHG

## 2023-09-27 DIAGNOSIS — I48.92 ATRIAL FLUTTER, UNSPECIFIED TYPE (HCC): ICD-10-CM

## 2023-09-27 DIAGNOSIS — R42 DIZZINESS AND GIDDINESS: ICD-10-CM

## 2023-09-27 DIAGNOSIS — I48.21 PERMANENT ATRIAL FIBRILLATION (HCC): ICD-10-CM

## 2023-09-27 DIAGNOSIS — R42 VERTIGO: ICD-10-CM

## 2023-09-27 DIAGNOSIS — Z95.0 PRESENCE OF CARDIAC PACEMAKER: Primary | ICD-10-CM

## 2023-09-27 DIAGNOSIS — I44.1 ATRIOVENTRICULAR BLOCK, SECOND DEGREE: ICD-10-CM

## 2023-09-27 DIAGNOSIS — H53.2 DOUBLE VISION: ICD-10-CM

## 2023-09-27 PROCEDURE — 1123F ACP DISCUSS/DSCN MKR DOCD: CPT | Performed by: INTERNAL MEDICINE

## 2023-09-27 PROCEDURE — 1036F TOBACCO NON-USER: CPT | Performed by: INTERNAL MEDICINE

## 2023-09-27 PROCEDURE — G8419 CALC BMI OUT NRM PARAM NOF/U: HCPCS | Performed by: INTERNAL MEDICINE

## 2023-09-27 PROCEDURE — G8427 DOCREV CUR MEDS BY ELIG CLIN: HCPCS | Performed by: INTERNAL MEDICINE

## 2023-09-27 PROCEDURE — 99214 OFFICE O/P EST MOD 30 MIN: CPT | Performed by: INTERNAL MEDICINE

## 2023-09-27 RX ORDER — DULOXETIN HYDROCHLORIDE 30 MG/1
1 CAPSULE, DELAYED RELEASE ORAL DAILY
COMMUNITY
Start: 2023-08-28

## 2023-09-27 ASSESSMENT — PATIENT HEALTH QUESTIONNAIRE - PHQ9
SUM OF ALL RESPONSES TO PHQ QUESTIONS 1-9: 0
SUM OF ALL RESPONSES TO PHQ QUESTIONS 1-9: 0
1. LITTLE INTEREST OR PLEASURE IN DOING THINGS: 0
SUM OF ALL RESPONSES TO PHQ QUESTIONS 1-9: 0
SUM OF ALL RESPONSES TO PHQ9 QUESTIONS 1 & 2: 0
SUM OF ALL RESPONSES TO PHQ QUESTIONS 1-9: 0
2. FEELING DOWN, DEPRESSED OR HOPELESS: 0

## 2023-09-27 NOTE — PROGRESS NOTES
Cardiac Electrophysiology Office Note     Subjective:       Moe Reid is a 80 y. o.man who is here today for follow up, is s/p [employer]ando dual chamber pacemaker (DOI 08/03/2016, leads 05/13/2009). Pacemaker 2 Years left on battery  Dependent   Persistent atrial flutter/fibrillation  Left knee pain   Vertigo   Worried about falls  Seen eye specialist and tried many meds, ENT  Not improving    Has bled score 3 (elevated creatinine, fall predisposition, age)  CHADS 2 vasc score (age mainly)    TRANSTHORACIC ECHOCARDIOGRAM (TTE) COMPLETE (CONTRAST/BUBBLE/3D PRN) 05/18/2023  7:58 AM (Final)    Interpretation Summary    Left Ventricle: Normal left ventricular systolic function. EF by 2D Simpsons Biplane is 55%. Left ventricle size is normal. Moderately increased wall thickness. Moderate septal thickening. Mild posterior thickening. Findings consistent with moderate asymmetric hypertrophy. Normal wall motion. Right Ventricle: Right ventricle is mildly dilated. Lead present in the right ventricle. Aortic Valve: Trileaflet valve. Mild sclerosis of the aortic valve cusp. Mild regurgitation. Mitral Valve: Valve structure is normal. Mild annular calcification of the mitral valve. Mild regurgitation. Tricuspid Valve: Moderate regurgitation. Moderately elevated RVSP. The estimated RVSP is 55 mmHg. Left Atrium: Left atrium is moderately dilated. Left atrial volume index is mildly increased (35-41 mL/m2). LA Vol Index A/L is 37 mL/m2. Right Atrium: Right atrium is mildly dilated. Aorta: Normal sized aortic root. Mildly dilated ascending aorta. Ao ascending diameter is 3.8 cm. LVEF normal with atrial fibrillation and pacemaker    Signed by: Cherelle Boswell MD on 5/18/2023  7:58 AM      He denies chest pain, +SOB, +  lower extremity edema.      His vision is double sometimes  Wait to see Dr Primitivo Perez of neurology    He said his eyes are heavy and \"full\", legs are swollen  He has to take prednisone for

## 2023-09-27 NOTE — PROGRESS NOTES
Room #: 2    C/o vertigo and double vision that has been ongoing for 4 years. Seeing Neuro. Chief Complaint   Patient presents with    Follow-up     3 mo fu    Device Check    Atrial Fibrillation       Vitals:    09/27/23 1428   BP: (!) 116/46   Site: Right Upper Arm   Position: Sitting   Cuff Size: Medium Adult   Pulse: 80   Resp: 18   SpO2: 99%   Weight: 176 lb 3.2 oz (79.9 kg)   Height: 5' 10\" (1.778 m)         Chest pain:  NO  Shortness of breath:  YES  Edema: NO  Palpitations, skipped beats, rapid heartbeat:  NO  Dizziness:  YES    1. Have you been to the ER, urgent care clinic since your last visit? Hospitalized since your last visit? NO    2. Have you seen or consulted any other health care providers outside of the [address] [place] [address] since your last visit? Include any pap smears or colon screening.  NO      Refills:  NO

## 2023-09-28 DIAGNOSIS — I48.21 PERMANENT ATRIAL FIBRILLATION (HCC): Primary | ICD-10-CM

## 2023-09-29 RX ORDER — APIXABAN 5 MG/1
5 TABLET, FILM COATED ORAL 2 TIMES DAILY
Qty: 180 TABLET | Refills: 3 | Status: SHIPPED | OUTPATIENT
Start: 2023-09-29

## 2023-09-29 NOTE — TELEPHONE ENCOUNTER
Per VO by NP.     Future Appointments   Date Time Provider [address]   11/2/2023  8:40 AM NICOL Watson - JOSE NEUROWTCRSPB BS AMB   5/8/2024  9:20 AM PACEMAKERMARTHA BS AMB   5/8/2024  9:40 AM Arely Zimmer MD CAVSF BS AMB

## 2023-11-02 ENCOUNTER — OFFICE VISIT (OUTPATIENT)
Age: 88
End: 2023-11-02
Payer: COMMERCIAL

## 2023-11-02 VITALS — DIASTOLIC BLOOD PRESSURE: 70 MMHG | OXYGEN SATURATION: 97 % | HEART RATE: 72 BPM | SYSTOLIC BLOOD PRESSURE: 130 MMHG

## 2023-11-02 DIAGNOSIS — R42 VERTIGO: ICD-10-CM

## 2023-11-02 DIAGNOSIS — R42 VERTIGO: Primary | ICD-10-CM

## 2023-11-02 DIAGNOSIS — H53.2 DIPLOPIA: ICD-10-CM

## 2023-11-02 PROCEDURE — G8419 CALC BMI OUT NRM PARAM NOF/U: HCPCS | Performed by: NURSE PRACTITIONER

## 2023-11-02 PROCEDURE — 99214 OFFICE O/P EST MOD 30 MIN: CPT | Performed by: NURSE PRACTITIONER

## 2023-11-02 PROCEDURE — G8484 FLU IMMUNIZE NO ADMIN: HCPCS | Performed by: NURSE PRACTITIONER

## 2023-11-02 PROCEDURE — 1123F ACP DISCUSS/DSCN MKR DOCD: CPT | Performed by: NURSE PRACTITIONER

## 2023-11-02 PROCEDURE — G8427 DOCREV CUR MEDS BY ELIG CLIN: HCPCS | Performed by: NURSE PRACTITIONER

## 2023-11-02 PROCEDURE — 1036F TOBACCO NON-USER: CPT | Performed by: NURSE PRACTITIONER

## 2023-11-02 NOTE — PROGRESS NOTES
No changes in symptoms   Hasnt had blood or CT
Physical Exam :    /70 (Site: Left Upper Arm, Position: Sitting, Cuff Size: Medium Adult)   Pulse 72   SpO2 97%   MRI Result (most recent):  No results found for this or any previous visit from the past 3650 days. CT Result (most recent):  No results found for this or any previous visit from the past 3650 days. EEG Result:      Carotid Doppler:        Recent Labs:  No results found for: \"WBC\", \"HGB\", \"HCT\", \"MCV\", \"PLT\"  Lab Results   Component Value Date     06/29/2023    K 4.8 06/29/2023     06/29/2023    CO2 33 (H) 06/29/2023    BUN 43 (H) 06/29/2023    CREATININE 1.42 (H) 06/29/2023    GLUCOSE 122 (H) 06/29/2023    CALCIUM 10.2 (H) 06/29/2023    LABGLOM 46 (L) 06/29/2023       No results found for: \"CHOL\"  No results found for: \"TRIG\"  No results found for: \"HDL\"  No results found for: \"LDLCHOLESTEROL\", \"LDLCALC\"  No results found for: \"LABVLDL\", \"VLDL\"  No results found for: \"CHOLHDLRATIO\"    No results found for: \"SEDRATE\"    No results found for: \"LABA1C\"  No results found for: \"DAHLIA\"             1. Vertigo  -     CT HEAD WO CONTRAST; Future  -     External Referral To Physical Therapy  2. Diplopia  -     CT HEAD WO CONTRAST; Future  -     External Referral To Physical Therapy       Doppler and EEG normal.     Still having symptoms daily, chronically. CT head re-ordered.    Will print blood work to get re-done today     Refer to physical therapy for PT, vertigo            This note will not be viewable in [employer]

## 2023-11-06 ENCOUNTER — HOSPITAL ENCOUNTER (EMERGENCY)
Facility: HOSPITAL | Age: 88
Discharge: HOME | End: 2023-11-06
Attending: EMERGENCY MEDICINE
Payer: MEDICARE

## 2023-11-06 VITALS
WEIGHT: 178 LBS | HEIGHT: 70 IN | TEMPERATURE: 97.5 F | DIASTOLIC BLOOD PRESSURE: 89 MMHG | SYSTOLIC BLOOD PRESSURE: 146 MMHG | HEART RATE: 55 BPM | OXYGEN SATURATION: 98 % | BODY MASS INDEX: 25.48 KG/M2 | RESPIRATION RATE: 16 BRPM

## 2023-11-06 DIAGNOSIS — S81.811A LACERATION OF RIGHT LOWER EXTREMITY, INITIAL ENCOUNTER: Primary | ICD-10-CM

## 2023-11-06 PROCEDURE — 90471 IMMUNIZATION ADMIN: CPT | Performed by: EMERGENCY MEDICINE

## 2023-11-06 PROCEDURE — 6370000000 HC RX 637 (ALT 250 FOR IP): Performed by: EMERGENCY MEDICINE

## 2023-11-06 PROCEDURE — 12002 RPR S/N/AX/GEN/TRNK2.6-7.5CM: CPT

## 2023-11-06 PROCEDURE — 6360000002 HC RX W HCPCS: Performed by: EMERGENCY MEDICINE

## 2023-11-06 PROCEDURE — 99284 EMERGENCY DEPT VISIT MOD MDM: CPT

## 2023-11-06 PROCEDURE — 90714 TD VACC NO PRESV 7 YRS+ IM: CPT | Performed by: EMERGENCY MEDICINE

## 2023-11-06 RX ORDER — CEPHALEXIN 500 MG/1
500 CAPSULE ORAL 3 TIMES DAILY
Qty: 21 CAPSULE | Refills: 0 | Status: SHIPPED | OUTPATIENT
Start: 2023-11-06 | End: 2023-11-13

## 2023-11-06 RX ORDER — CEPHALEXIN 250 MG/1
500 CAPSULE ORAL
Status: COMPLETED | OUTPATIENT
Start: 2023-11-06 | End: 2023-11-06

## 2023-11-06 RX ORDER — TETANUS AND DIPHTHERIA TOXOIDS ADSORBED 2; 2 [LF]/.5ML; [LF]/.5ML
0.5 INJECTION INTRAMUSCULAR
Status: COMPLETED | OUTPATIENT
Start: 2023-11-06 | End: 2023-11-06

## 2023-11-06 RX ADMIN — TETANUS AND DIPHTHERIA TOXOIDS ADSORBED 0.5 ML: 2; 2 INJECTION INTRAMUSCULAR at 22:03

## 2023-11-06 RX ADMIN — Medication 3 ML: at 20:28

## 2023-11-06 RX ADMIN — CEPHALEXIN 500 MG: 250 CAPSULE ORAL at 22:24

## 2023-11-06 ASSESSMENT — ENCOUNTER SYMPTOMS
SORE THROAT: 0
ABDOMINAL PAIN: 0
COUGH: 0
RHINORRHEA: 0
VOMITING: 0
BACK PAIN: 0
SHORTNESS OF BREATH: 0

## 2023-11-06 ASSESSMENT — PAIN DESCRIPTION - ORIENTATION: ORIENTATION: LOWER;RIGHT

## 2023-11-06 ASSESSMENT — PAIN SCALES - GENERAL: PAINLEVEL_OUTOF10: 5

## 2023-11-06 ASSESSMENT — PAIN DESCRIPTION - LOCATION: LOCATION: LEG

## 2023-11-07 NOTE — ED PROVIDER NOTES
[facility] EMERGENCY DEPT  EMERGENCY DEPARTMENT ENCOUNTER      Pt Name: Antonio Viveros  MRN: [id number]  [address] [facility] 1/9/1930  Date of evaluation: 11/6/2023  Provider: Carlitos George MD      HISTORY OF PRESENT ILLNESS      80year-old male with pacemaker currently on Eliquis presenting to the ER for leg laceration. Patient accidentally bumped his leg against a wooden piece of furniture at home. Sustained a large laceration to the right lateral leg. Bleeding is controlled on arrival              Nursing Notes were reviewed. REVIEW OF SYSTEMS         Review of Systems   Constitutional:  Negative for fatigue and fever. HENT:  Negative for rhinorrhea and sore throat. Respiratory:  Negative for cough and shortness of breath. Cardiovascular:  Negative for chest pain. Gastrointestinal:  Negative for abdominal pain and vomiting. Musculoskeletal:  Negative for back pain and neck pain. Skin:  Positive for wound. Negative for rash. Neurological:  Negative for dizziness, weakness and headaches.            PAST MEDICAL HISTORY     Past Medical History:   Diagnosis Date    DJD (degenerative joint disease) of cervical spine     DJD (degenerative joint disease) of hip     Hyperlipidemia     Ill-defined condition 09/2016    Pneumonia    Mobitz (type) II atrioventricular block     Pacemaker     Sinoatrial node dysfunction ([address])          SURGICAL HISTORY       Past Surgical History:   Procedure Laterality Date    GEN INSERT/REPLACE ONLY DUAL  8/3/2016         HERNIA REPAIR Right     Inguinal Hernia    MOHS SURGERY      PACEMAKER  2008 & 2016    Right Upper Chest    ROTATOR CUFF REPAIR Right 1998 & 2003    TOTAL KNEE ARTHROPLASTY Left 07/2016    Partial Knee Replacement    UROLOGICAL SURGERY      TURP-for BPH         CURRENT MEDICATIONS       Previous Medications    DULOXETINE (CYMBALTA) 30 MG EXTENDED RELEASE CAPSULE    Take 1 capsule by mouth daily    ELIQUIS 5 MG TABS TABLET    TAKE 1 TABLET BY MOUTH  TWICE DAILY

## 2023-11-07 NOTE — ED NOTES
The patient was discharged home by Dr. Rissa Coleman and Fidel Moulton rn in stable condition, accompanied by family. The patient is alert and oriented, is in no respiratory distress and has vital signs within normal limits . The patient's diagnosis, condition and treatment were explained to patient. The patient expressed understanding. No prescriptions given to pt. No work/school note given to pt. A discharge plan has been developed. A  was not involved in the process. Aftercare instructions were given to the patient. Family will transport pt home.       Marvin Rojo RN  11/06/23 7025
Quality 226: Preventive Care And Screening: Tobacco Use: Screening And Cessation Intervention: Patient screened for tobacco use and is an ex/non-smoker
Detail Level: Detailed

## 2023-11-07 NOTE — ED TRIAGE NOTES
Pt has a laceration to lateral side of right leg. Pt takes eloquis but bleeding is controlled at this time. Pt hit his leg on an end table.

## 2023-11-08 ENCOUNTER — HOSPITAL ENCOUNTER (OUTPATIENT)
Facility: HOSPITAL | Age: 88
Discharge: HOME | End: 2023-11-08
Attending: EMERGENCY MEDICINE
Payer: MEDICARE

## 2023-11-08 VITALS
SYSTOLIC BLOOD PRESSURE: 122 MMHG | HEART RATE: 70 BPM | TEMPERATURE: 98.6 F | DIASTOLIC BLOOD PRESSURE: 66 MMHG | RESPIRATION RATE: 18 BRPM

## 2023-11-08 DIAGNOSIS — L97.912 ULCER OF RIGHT LOWER EXTREMITY WITH FAT LAYER EXPOSED (HCC): ICD-10-CM

## 2023-11-08 PROCEDURE — 99213 OFFICE O/P EST LOW 20 MIN: CPT

## 2023-11-08 RX ORDER — LIDOCAINE HYDROCHLORIDE 20 MG/ML
JELLY TOPICAL ONCE
OUTPATIENT
Start: 2023-11-08 | End: 2023-11-08

## 2023-11-08 RX ORDER — CLOBETASOL PROPIONATE 0.5 MG/G
OINTMENT TOPICAL ONCE
OUTPATIENT
Start: 2023-11-08 | End: 2023-11-08

## 2023-11-08 RX ORDER — LIDOCAINE 50 MG/G
OINTMENT TOPICAL ONCE
OUTPATIENT
Start: 2023-11-08 | End: 2023-11-08

## 2023-11-08 RX ORDER — TRIAMCINOLONE ACETONIDE 1 MG/G
OINTMENT TOPICAL ONCE
OUTPATIENT
Start: 2023-11-08 | End: 2023-11-08

## 2023-11-08 RX ORDER — LIDOCAINE 40 MG/G
CREAM TOPICAL ONCE
OUTPATIENT
Start: 2023-11-08 | End: 2023-11-08

## 2023-11-08 RX ORDER — SODIUM CHLOR/HYPOCHLOROUS ACID 0.033 %
SOLUTION, IRRIGATION IRRIGATION ONCE
OUTPATIENT
Start: 2023-11-08 | End: 2023-11-08

## 2023-11-08 RX ORDER — IBUPROFEN 200 MG
TABLET ORAL ONCE
OUTPATIENT
Start: 2023-11-08 | End: 2023-11-08

## 2023-11-08 RX ORDER — LIDOCAINE HYDROCHLORIDE 40 MG/ML
SOLUTION TOPICAL ONCE
OUTPATIENT
Start: 2023-11-08 | End: 2023-11-08

## 2023-11-08 RX ORDER — GENTAMICIN SULFATE 1 MG/G
OINTMENT TOPICAL ONCE
OUTPATIENT
Start: 2023-11-08 | End: 2023-11-08

## 2023-11-08 RX ORDER — BACITRACIN ZINC AND POLYMYXIN B SULFATE 500; 1000 [USP'U]/G; [USP'U]/G
OINTMENT TOPICAL ONCE
OUTPATIENT
Start: 2023-11-08 | End: 2023-11-08

## 2023-11-08 RX ORDER — BETAMETHASONE DIPROPIONATE 0.5 MG/G
CREAM TOPICAL ONCE
OUTPATIENT
Start: 2023-11-08 | End: 2023-11-08

## 2023-11-08 RX ORDER — GINSENG 100 MG
CAPSULE ORAL ONCE
OUTPATIENT
Start: 2023-11-08 | End: 2023-11-08

## 2023-11-08 ASSESSMENT — PAIN DESCRIPTION - ORIENTATION: ORIENTATION: LOWER;RIGHT

## 2023-11-08 ASSESSMENT — PAIN DESCRIPTION - LOCATION: LOCATION: LEG

## 2023-11-08 ASSESSMENT — PAIN SCALES - GENERAL: PAINLEVEL_OUTOF10: 2

## 2023-11-08 NOTE — PROGRESS NOTES
11/08/23 1422   Wound Length (cm) 6 cm 11/08/23 1334   Wound Width (cm) 5.1 cm 11/08/23 1334   Wound Depth (cm) 0.1 cm 11/08/23 1334   Wound Surface Area (cm^2) 30.6 cm^2 11/08/23 1334   Wound Volume (cm^3) 3.06 cm^3 11/08/23 1334   Wound Assessment Pink/red;Slough; Other (Comment) 11/08/23 1334   Drainage Amount Moderate (25-50%) 11/08/23 1334   Drainage Description Serosanguinous 11/08/23 1334   Odor None 11/08/23 1334   Pratima-wound Assessment Ecchymosis;Blanchable erythema 11/08/23 1334   Margins Flat/open edges 11/08/23 1334   Number of days: 0              Chief Complaint (CC): non healing wound RLE  Present Illness (PI): patient tore skin RLE on a table at home 3-4 days ago, treated in ED, sutured wound, wife told to see wound care, then follow up at 2 weeks for suture removal. .The wound bled a lot at first. Wife is using acewrap on the wound itself  Pertinent Past History (PMedHx): reduced hearing, blood thinner use. John Hedrick Also noted:                         Medications and Allergies: as per [address]. I have reviewed and concur. Illnesses: as per '[address]' recorded data noted today. Surgeries and Injuries: as per '[address]' recorded data noted today. .    Review of Systems (ROS):                        Integumentary: Other than as noted in 'PI'; skin hair and nails normal for age, with no new rash, lumps, bumps, eruptions or bleeding. Lymph: no new prominent nodes or drainage near lymph nodes. Bones, Joints, and Muscles: Other than as noted in 'PI' no new fractures, dislocations, weakness or pain. .                        Hematopoietic: no new bleeding or bruising or anemia changes. On eliquis does bleed. Eyes: no recent trauma or inflammation. 0. Eye glasses. has. Intra Occular Lens Implants (IOLI)                        Ears: Hearing is unchanged and usually  reduced.

## 2023-11-08 NOTE — FLOWSHEET NOTE
11/08/23 1422   Right Leg Edema Point of Measurement   Compression Therapy Tubular elastic support bandage   Wound 11/08/23 Leg Right;Lateral;Lower #1   Date First Assessed/Time First Assessed: 11/08/23 1334   Present on Original Admission: Yes  Location: Leg  Wound Location Orientation: Right;Lateral;Lower  Wound Description (Comments): #1   Dressing/Treatment Xeroform;Gauze dressing/dressing sponge;Roll gauze;Tape/Soft cloth adhesive tape;ABD     Discharge Condition: Stable    Pain: 0    Ambulatory Status: Straight Cane    Discharge Destination: home    Transportation:car    Accompanied by: FAMILY    Discharge instructions reviewed with FAMILY and patient and copy or written instructions have been provided. All questions/concerns have been addressed at this time.

## 2023-11-08 NOTE — PATIENT INSTRUCTIONS
Discharge Instructions for  [address]  Telephone: [phone]    NAME:  Dheeraj Due OF BIRTH:  1/9/1930  ACCOUNT NUMBER :  [de-identified]  DATE:  11/8/2023     : Miguel Angel Evans, RN     Supplies: [employer]    Wound Cleansing:   Do not scrub or use excessive force. Cleanse wound prior to applying a clean dressing with:      [x] Cleanse wound with: BABY SHAMPOO LATHER  LEAVE on 1-2 MINUTES ON WOUND THEN RINSE WITH WATER OR SALINE    [] May Shower at Discharge     [] Shower on days of dressing change, remove dressing first    [] Keep Wound Dry in Shower (may purchase a cast cover if needed)      Dressings:           Wound Location Right lateral lower leg wound    Apply Primary Dressing:       [] MediHoney Gel    [] MediHoney Alginate  [] Alginate     [] Alginate with Silver       [] Collagen   [] Collagen with Silver     [] Hydrocolloid  [] Hydrofera Blue Classic (moisten with saline)  [] Hydrofera Blue Ready  [x] Other:  Xeroform gauze     Cover and Secure with:    [x] Gauze   [] Roosevelt   [x] Kerlix  [] Ace Wrap     [x] ABD  [] Medipore tape  [x] tape  [] Other:    Avoid contact of tape with skin. Change dressing:   [] Daily      [x] Every Other Day   [] Three times per week  [] Once a week   [] Do Not Change Dressing     [] Other:    Edema Control:   Apply every morning immediately when getting up should be applied to affected leg(s) from mid foot to knee making sure to cover the heel. Remove every night before going to bed. Apply: [] Compression Stocking []Right Leg []Left Leg     [x] Tubigrip [x]Right Leg Double Layer []Left Leg Double Layer      []Right Leg Single Layer []Left Leg Single Layer     [x] Elevate leg(s) when sitting. [x] Avoid prolonged standing in one place.     Activity:  Activity as tolerated:    [x] Patient has no activity restrictions       [] Strict Bedrest:   [] Remain off Work:     []

## 2023-11-09 LAB — MUSK AB SER IA-ACNC: <1 U/ML

## 2023-11-10 LAB
ACHR AB SER-SCNC: <0.03 NMOL/L (ref 0–0.24)
ACHR BLOCK AB SER-ACNC: 19 % (ref 0–25)
ACHR MOD AB/ACHR TOTAL SFR SER: NORMAL %
ACHR MODULATING AB: 0 % (ref 0–45)

## 2023-11-15 ENCOUNTER — HOSPITAL ENCOUNTER (OUTPATIENT)
Facility: HOSPITAL | Age: 88
Discharge: HOME | End: 2023-11-18
Payer: MEDICARE

## 2023-11-15 DIAGNOSIS — H53.2 DIPLOPIA: ICD-10-CM

## 2023-11-15 DIAGNOSIS — R42 VERTIGO: ICD-10-CM

## 2023-11-15 PROCEDURE — 70450 CT HEAD/BRAIN W/O DYE: CPT

## 2023-11-22 ENCOUNTER — HOSPITAL ENCOUNTER (OUTPATIENT)
Facility: HOSPITAL | Age: 88
Discharge: HOME | End: 2023-11-22
Attending: EMERGENCY MEDICINE
Payer: MEDICARE

## 2023-11-22 VITALS
RESPIRATION RATE: 18 BRPM | TEMPERATURE: 97.5 F | DIASTOLIC BLOOD PRESSURE: 75 MMHG | SYSTOLIC BLOOD PRESSURE: 135 MMHG | HEART RATE: 70 BPM

## 2023-11-22 DIAGNOSIS — L98.492 SKIN ULCER OF UPPER ARM WITH FAT LAYER EXPOSED (HCC): ICD-10-CM

## 2023-11-22 DIAGNOSIS — L97.912 ULCER OF RIGHT LOWER EXTREMITY WITH FAT LAYER EXPOSED (HCC): Primary | ICD-10-CM

## 2023-11-22 PROCEDURE — 87147 CULTURE TYPE IMMUNOLOGIC: CPT

## 2023-11-22 PROCEDURE — 87186 SC STD MICRODIL/AGAR DIL: CPT

## 2023-11-22 PROCEDURE — 11043 DBRDMT MUSC&/FSCA 1ST 20/<: CPT

## 2023-11-22 PROCEDURE — 87070 CULTURE OTHR SPECIMN AEROBIC: CPT

## 2023-11-22 PROCEDURE — 87205 SMEAR GRAM STAIN: CPT

## 2023-11-22 PROCEDURE — 12002 RPR S/N/AX/GEN/TRNK2.6-7.5CM: CPT | Performed by: EMERGENCY MEDICINE

## 2023-11-22 PROCEDURE — 87077 CULTURE AEROBIC IDENTIFY: CPT

## 2023-11-22 RX ORDER — LIDOCAINE 50 MG/G
OINTMENT TOPICAL ONCE
OUTPATIENT
Start: 2023-11-22 | End: 2023-11-22

## 2023-11-22 RX ORDER — CLOBETASOL PROPIONATE 0.5 MG/G
OINTMENT TOPICAL ONCE
OUTPATIENT
Start: 2023-11-22 | End: 2023-11-22

## 2023-11-22 RX ORDER — BETAMETHASONE DIPROPIONATE 0.5 MG/G
CREAM TOPICAL ONCE
OUTPATIENT
Start: 2023-11-22 | End: 2023-11-22

## 2023-11-22 RX ORDER — LIDOCAINE HYDROCHLORIDE 20 MG/ML
JELLY TOPICAL ONCE
OUTPATIENT
Start: 2023-11-22 | End: 2023-11-22

## 2023-11-22 RX ORDER — GINSENG 100 MG
CAPSULE ORAL ONCE
OUTPATIENT
Start: 2023-11-22 | End: 2023-11-22

## 2023-11-22 RX ORDER — LIDOCAINE 40 MG/G
CREAM TOPICAL ONCE
OUTPATIENT
Start: 2023-11-22 | End: 2023-11-22

## 2023-11-22 RX ORDER — SODIUM CHLOR/HYPOCHLOROUS ACID 0.033 %
SOLUTION, IRRIGATION IRRIGATION ONCE
OUTPATIENT
Start: 2023-11-22 | End: 2023-11-22

## 2023-11-22 RX ORDER — TRIAMCINOLONE ACETONIDE 1 MG/G
OINTMENT TOPICAL ONCE
OUTPATIENT
Start: 2023-11-22 | End: 2023-11-22

## 2023-11-22 RX ORDER — IBUPROFEN 200 MG
TABLET ORAL ONCE
OUTPATIENT
Start: 2023-11-22 | End: 2023-11-22

## 2023-11-22 RX ORDER — BACITRACIN ZINC AND POLYMYXIN B SULFATE 500; 1000 [USP'U]/G; [USP'U]/G
OINTMENT TOPICAL ONCE
OUTPATIENT
Start: 2023-11-22 | End: 2023-11-22

## 2023-11-22 RX ORDER — GENTAMICIN SULFATE 1 MG/G
OINTMENT TOPICAL ONCE
OUTPATIENT
Start: 2023-11-22 | End: 2023-11-22

## 2023-11-22 RX ORDER — LIDOCAINE HYDROCHLORIDE 40 MG/ML
SOLUTION TOPICAL ONCE
OUTPATIENT
Start: 2023-11-22 | End: 2023-11-22

## 2023-11-22 ASSESSMENT — PAIN DESCRIPTION - ORIENTATION: ORIENTATION: LEFT;RIGHT

## 2023-11-22 ASSESSMENT — PAIN DESCRIPTION - DESCRIPTORS: DESCRIPTORS: SORE

## 2023-11-22 ASSESSMENT — PAIN SCALES - GENERAL: PAINLEVEL_OUTOF10: 4

## 2023-11-22 ASSESSMENT — PAIN DESCRIPTION - LOCATION: LOCATION: KNEE;ARM

## 2023-11-22 NOTE — WOUND CARE
[address]:     [address] [facility], [address] [facility] f: [phone] f: [phone] p: [phone] [email or web address]      Ordering Center:     [address] [place] [address] Drive  [phone]  WOUND CARE Dept: [phone]   FAX NUMBER     Patient Information:      Mustapha Enrique  [id number] [address]  [facility]   [phone]   : 1930  AGE: 80 y.o. GENDER: male   EPISODE DATE: 2023    Insurance:      PRIMARY INSURANCE:  Plan: [id number] MEDICARE ADVANTAGE  Coverage: [facility] MEDICARE  Effective Date: 2023  Group Number: [unfilled]  Subscriber Number: [id number] - (Medicare Managed)    Payer/Plan Subscr  Sex Relation Sub. Ins. ID Effective Group Num   1.  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 ([address]) - Primary    Relevant Orders    Initiate Outpatient Wound Care Protocol    Skin ulcer of upper arm with fat layer exposed ([address])       WOUNDS REQUIRING DRESSING SUPPLIES:     Wound 23 Leg Right;Lateral;Lower #1 (Active)   Wound Image   23 1416   Dressing Status Old drainage noted 23 1334   Wound Cleansed Cleansed with saline 23 1416   Dressing/Treatment Xeroform;Gauze dressing/dressing sponge;Roll gauze;Tape/Soft cloth adhesive tape;ABD 23 1422   Wound Length (cm) 6 cm 23 1416   Wound Width (cm) 4.5 cm 23 1416   Wound Depth (cm) 0.4 cm 23 1416   Wound Surface Area (cm^2) 27 cm^2 23 1416   Change in Wound Size % (l*w) 11.76 23 1416   Wound Volume (cm^3) 10.8 cm^3 23 1416   Wound Healing % -253 23 1416   Post-Procedure Length (cm) 6 cm 23 1520   Post-Procedure Width (cm) 4.5 cm 23 1520

## 2023-11-22 NOTE — FLOWSHEET NOTE
11/22/23 1416   Anesthetic   Anesthetic 4% Lidocaine Liquid Topical   Right Leg Edema Point of Measurement   Leg circumference 33.5 cm   Ankle circumference 23 cm   RLE Neurovascular Assessment   Capillary Refill Greater than 3 seconds   Color Appropriate for Ethnicity   Temperature Cool   R Pedal Pulse +2   Wound 11/22/23 Arm Left;Upper #2 clustered   Date First Assessed/Time First Assessed: 11/22/23 1419   Present on Original Admission: Yes  Primary Wound Type: Traumatic  Location: Arm  Wound Location Orientation: Left;Upper  Wound Description (Comments): #2 clustered   Wound Image    Wound Etiology Skin Tear   Wound Cleansed Cleansed with saline   Wound Length (cm) 7 cm   Wound Width (cm) 10 cm   Wound Depth (cm) 0.1 cm   Wound Surface Area (cm^2) 70 cm^2   Wound Volume (cm^3) 7 cm^3   Wound Assessment Pink/red   Drainage Amount Moderate (25-50%)   Drainage Description Sanguinous   Odor None   Pratima-wound Assessment Fragile   Margins Flat/open edges; Undefined edges   Wound 11/22/23 Elbow Right #3   Date First Assessed/Time First Assessed: 11/22/23 1419   Present on Original Admission: Yes  Primary Wound Type: Traumatic  Location: Elbow  Wound Location Orientation: Right  Wound Description (Comments): #3   Wound Image    Wound Etiology Traumatic   Wound Cleansed Cleansed with saline   Wound Length (cm) 1.7 cm   Wound Width (cm) 0.8 cm   Wound Depth (cm) 0.1 cm   Wound Surface Area (cm^2) 1.36 cm^2   Wound Volume (cm^3) 0.136 cm^3   Wound Assessment Slough;Pink/red   Drainage Amount Scant (moist but unmeasurable)   Drainage Description Yellow   Odor None   Pratima-wound Assessment Blanchable erythema   Margins Flat/open edges   Wound 11/08/23 Leg Right;Lateral;Lower #1   Date First Assessed/Time First Assessed: 11/08/23 1334   Present on Original Admission: Yes  Location: Leg  Wound Location Orientation: Right;Lateral;Lower  Wound Description (Comments): #1   Wound Image    Wound Cleansed Cleansed with saline

## 2023-11-22 NOTE — PATIENT INSTRUCTIONS
Discharge Instructions for  [address]  Telephone: [phone]    NAME:  Mel Nicole OF BIRTH:  1/9/1930  ACCOUNT NUMBER :  [de-identified]  DATE:  11/22/2023     : Jodee Sanchez, UGO     Supplies: Prism    Wound Cleansing:   Do not scrub or use excessive force. Cleanse wound prior to applying a clean dressing with:      [x] Cleanse wound with: BABY SHAMPOO LATHER  LEAVE on 1-2 MINUTES ON WOUND THEN RINSE WITH WATER OR SALINE    [] May Shower at Discharge     [] Shower on days of dressing change, remove dressing first    [] Keep Wound Dry in Shower (may purchase a cast cover if needed)      Dressings:           Wound Location Right lateral lower leg wound    Apply Primary Dressing:       [] MediHoney Gel    [] MediHoney Alginate  [] Alginate     [x] Alginate with Silver       [] Collagen   [] Collagen with Silver     [] Hydrocolloid  [] Hydrofera Blue Classic (moisten with saline)  [] Hydrofera Blue Ready  [] Other:      Cover and Secure with:    [x] Gauze   [] Roosevelt   [x] Kerlix  [] Ace Wrap     [] ABD  [] Medipore tape  [x] tape  [x] Other: super absorber    Avoid contact of tape with skin.     Change dressing:   [x] Daily      [] Every Other Day   [] Three times per week  [] Once a week   [] Do Not Change Dressing     [] Other:    Dressings:           Wound Location Left upper arm   Apply Primary Dressing:       [] MediHoney Gel    [] MediHoney Alginate  [] Alginate     [] Alginate with Silver       [] Collagen   [] Collagen with Silver     [] Hydrocolloid  [] Hydrofera Blue Classic (moisten with saline)  [] Hydrofera Blue Ready  [x] Other: Steri strips, adaptic, gauze, roll gauze, tape, tubi size g upper and size f lower arm make into a glove     Change dressing:   [] Daily      [] Every Other Day   [] Three times per week  [] Once a week   [x] Do Not Change Dressing     [] Other:    Dressings:           Wound

## 2023-11-22 NOTE — FLOWSHEET NOTE
11/22/23 1600   Right Leg Edema Point of Measurement   Compression Therapy Tubular elastic support bandage   Wound 11/22/23 Arm Left;Upper #2 clustered   Date First Assessed/Time First Assessed: 11/22/23 1419   Present on Original Admission: Yes  Primary Wound Type: Traumatic  Location: Arm  Wound Location Orientation: Left;Upper  Wound Description (Comments): #2 clustered   Dressing/Treatment Other (comment)  (preformed by MD)   Wound 11/22/23 Elbow Right #3   Date First Assessed/Time First Assessed: 11/22/23 1419   Present on Original Admission: Yes  Primary Wound Type: Traumatic  Location: Elbow  Wound Location Orientation: Right  Wound Description (Comments): #3   Dressing/Treatment Xeroform;Silicone border   Wound 11/08/23 Leg Right;Lateral;Lower #1   Date First Assessed/Time First Assessed: 11/08/23 1334   Present on Original Admission: Yes  Location: Leg  Wound Location Orientation: Right;Lateral;Lower  Wound Description (Comments): #1   Dressing/Treatment Alginate with Ag;Gauze dressing/dressing sponge;Roll gauze;Tape/Soft cloth adhesive tape  (superabsorber)     Discharge Condition: Stable    Pain: 0    Ambulatory Status:Walking    Discharge Destination: Home    Transportation:Car    Accompanied by: Self and Family    Discharge instructions reviewed with Self and Family and copy or written instructions have been provided. All questions/concerns have been addressed at this time.

## 2023-11-25 LAB
BACTERIA SPEC CULT: ABNORMAL
BACTERIA SPEC CULT: ABNORMAL
GRAM STN SPEC: ABNORMAL
GRAM STN SPEC: ABNORMAL
SERVICE CMNT-IMP: ABNORMAL

## 2023-11-29 ENCOUNTER — HOSPITAL ENCOUNTER (OUTPATIENT)
Facility: HOSPITAL | Age: 88
Discharge: HOME | End: 2023-11-29
Attending: EMERGENCY MEDICINE
Payer: MEDICARE

## 2023-11-29 VITALS
HEART RATE: 60 BPM | SYSTOLIC BLOOD PRESSURE: 144 MMHG | RESPIRATION RATE: 18 BRPM | TEMPERATURE: 97.4 F | DIASTOLIC BLOOD PRESSURE: 65 MMHG

## 2023-11-29 DIAGNOSIS — L97.912 ULCER OF RIGHT LOWER EXTREMITY WITH FAT LAYER EXPOSED (HCC): Primary | ICD-10-CM

## 2023-11-29 PROCEDURE — 11043 DBRDMT MUSC&/FSCA 1ST 20/<: CPT

## 2023-11-29 PROCEDURE — 11046 DBRDMT MUSC&/FSCA EA ADDL: CPT

## 2023-11-29 RX ORDER — TRIAMCINOLONE ACETONIDE 1 MG/G
OINTMENT TOPICAL ONCE
OUTPATIENT
Start: 2023-11-29 | End: 2023-11-29

## 2023-11-29 RX ORDER — LIDOCAINE 50 MG/G
OINTMENT TOPICAL ONCE
OUTPATIENT
Start: 2023-11-29 | End: 2023-11-29

## 2023-11-29 RX ORDER — LIDOCAINE HYDROCHLORIDE 40 MG/ML
SOLUTION TOPICAL ONCE
OUTPATIENT
Start: 2023-11-29 | End: 2023-11-29

## 2023-11-29 RX ORDER — LIDOCAINE HYDROCHLORIDE 20 MG/ML
JELLY TOPICAL ONCE
OUTPATIENT
Start: 2023-11-29 | End: 2023-11-29

## 2023-11-29 RX ORDER — BACITRACIN ZINC AND POLYMYXIN B SULFATE 500; 1000 [USP'U]/G; [USP'U]/G
OINTMENT TOPICAL ONCE
OUTPATIENT
Start: 2023-11-29 | End: 2023-11-29

## 2023-11-29 RX ORDER — LIDOCAINE 40 MG/G
CREAM TOPICAL ONCE
OUTPATIENT
Start: 2023-11-29 | End: 2023-11-29

## 2023-11-29 RX ORDER — IBUPROFEN 200 MG
TABLET ORAL ONCE
OUTPATIENT
Start: 2023-11-29 | End: 2023-11-29

## 2023-11-29 RX ORDER — GENTAMICIN SULFATE 1 MG/G
OINTMENT TOPICAL ONCE
OUTPATIENT
Start: 2023-11-29 | End: 2023-11-29

## 2023-11-29 RX ORDER — BETAMETHASONE DIPROPIONATE 0.5 MG/G
CREAM TOPICAL ONCE
OUTPATIENT
Start: 2023-11-29 | End: 2023-11-29

## 2023-11-29 RX ORDER — GINSENG 100 MG
CAPSULE ORAL ONCE
OUTPATIENT
Start: 2023-11-29 | End: 2023-11-29

## 2023-11-29 RX ORDER — CLOBETASOL PROPIONATE 0.5 MG/G
OINTMENT TOPICAL ONCE
OUTPATIENT
Start: 2023-11-29 | End: 2023-11-29

## 2023-11-29 RX ORDER — SODIUM CHLOR/HYPOCHLOROUS ACID 0.033 %
SOLUTION, IRRIGATION IRRIGATION ONCE
OUTPATIENT
Start: 2023-11-29 | End: 2023-11-29

## 2023-11-29 NOTE — PATIENT INSTRUCTIONS
Discharge Instructions for  [address] [place]  Telephone: [phone]    NAME:  Austin Paz OF BIRTH:  1/9/1930  ACCOUNT NUMBER :  [de-identified]  DATE:  11/29/2023     : Sonny Marte RN     Supplies: Prism    A referral for IV infusion of Dalvance will be sent to the Outpatient Infusion center at [facility]. Someone from the Infusion center will call you to set up the appointment. Address:   [address] [place] Salazar  [phone]    Monday 9:00 AM - 5:00 PM  Tuesday 9:00 AM - 5:00 PM  Wednesday 9:00 AM - 5:00 PM  Thursday 9:00 AM - 5:00 PM  Friday             9:00 AM - 5:00 PM  Saturday Closed  Sunday Closed    Wound Cleansing:   Do not scrub or use excessive force. Cleanse wound prior to applying a clean dressing with:      [x] Cleanse wound with: BABY SHAMPOO LATHER  LEAVE on 1-2 MINUTES ON WOUND THEN RINSE WITH WATER OR SALINE    [] May Shower at Discharge     [] Shower on days of dressing change, remove dressing first    [] Keep Wound Dry in Shower (may purchase a cast cover if needed)      Dressings:           Wound Location Right lateral lower leg wound    Apply Primary Dressing: Mupirocin        [] MediHoney Gel    [] MediHoney Alginate  [] Alginate     [x] Alginate with Silver       [] Collagen   [] Collagen with Silver     [] Hydrocolloid  [] Hydrofera Blue Classic (moisten with saline)  [] Hydrofera Blue Ready  [] Other:      Cover and Secure with:    [x] Gauze   [] Roosevelt   [x] Kerlix  [] Ace Wrap     [] ABD  [] Medipore tape  [x] tape  [x] Other: super absorber    Avoid contact of tape with skin.     Change dressing:   [x] Daily      [] Every Other Day   [] Three times per week  [] Once a week   [] Do Not Change Dressing     [] Other:    Dressings:           Wound Location Left upper arm   Apply Primary Dressing:

## 2023-11-29 NOTE — PROGRESS NOTES
[address] [place] [address]   Progress Note and Procedure Note   NO Procedure    Patient Name: Rashmi Najera  : 1930  MRN: [id number]    Past Medical History:   Diagnosis Date    DJD (degenerative joint disease) of cervical spine     DJD (degenerative joint disease) of hip     Hyperlipidemia     Ill-defined condition 2016    Pneumonia    Mobitz (type) II atrioventricular block     Pacemaker     Sinoatrial node dysfunction ([address])     Vertigo     pt states it's a side effect from Covid 2020     Past Surgical History:   Procedure Laterality Date    GEN INSERT/REPLACE ONLY DUAL  8/3/2016         HERNIA REPAIR Right     Inguinal Hernia    MOHS SURGERY      PACEMAKER   &     Right Upper Chest    ROTATOR CUFF REPAIR Right  &     TOTAL KNEE ARTHROPLASTY Left 2016    Partial Knee Replacement    UROLOGICAL SURGERY      TURP-for BPH     History reviewed. No pertinent family history. Social History     Tobacco Use    Smoking status: Never    Smokeless tobacco: Never   Substance Use Topics    Alcohol use: No     Alcohol/week: 0.0 standard drinks of alcohol    Drug use: No     No Known Allergies  Current Outpatient Medications on File Prior to Encounter   Medication Sig Dispense Refill    ELIQUIS 5 MG TABS tablet TAKE 1 TABLET BY MOUTH  TWICE DAILY 180 tablet 3    DULoxetine (CYMBALTA) 30 MG extended release capsule Take 1 capsule by mouth daily      predniSONE (DELTASONE) 10 MG tablet Take 1 tablet by mouth daily      simvastatin (ZOCOR) 40 MG tablet Take 1 tablet by mouth nightly       No current facility-administered medications on file prior to encounter. No results found for: \"LABA1C\"      Vitals:    23 1451   BP: (!) 144/65   Pulse: 60   Resp:    Temp:       Wound 23 Leg Right;Lateral;Lower #1 (Active)   Wound Image   23 1451   Dressing Status Old drainage noted 23 1334   Wound Cleansed Cleansed with saline 23 1550   Dressing/Treatment ABD; Alginate

## 2023-11-30 PROBLEM — L08.9 WOUND INFECTION: Status: ACTIVE | Noted: 2023-11-30

## 2023-11-30 PROBLEM — T14.8XXA WOUND INFECTION: Status: ACTIVE | Noted: 2023-11-30

## 2023-12-05 ENCOUNTER — HOSPITAL ENCOUNTER (OUTPATIENT)
Facility: HOSPITAL | Age: 88
Setting detail: INFUSION SERIES
Discharge: HOME | End: 2023-12-05
Payer: MEDICARE

## 2023-12-05 VITALS
TEMPERATURE: 97.5 F | DIASTOLIC BLOOD PRESSURE: 65 MMHG | HEART RATE: 60 BPM | OXYGEN SATURATION: 99 % | RESPIRATION RATE: 18 BRPM | SYSTOLIC BLOOD PRESSURE: 130 MMHG

## 2023-12-05 DIAGNOSIS — L08.9 WOUND INFECTION: Primary | ICD-10-CM

## 2023-12-05 DIAGNOSIS — T14.8XXA WOUND INFECTION: Primary | ICD-10-CM

## 2023-12-05 PROCEDURE — 6360000002 HC RX W HCPCS: Performed by: EMERGENCY MEDICINE

## 2023-12-05 PROCEDURE — 2580000003 HC RX 258: Performed by: EMERGENCY MEDICINE

## 2023-12-05 PROCEDURE — 96365 THER/PROPH/DIAG IV INF INIT: CPT

## 2023-12-05 RX ADMIN — DALBAVANCIN 1500 MG: 500 INJECTION, POWDER, FOR SOLUTION INTRAVENOUS at 14:15

## 2023-12-05 ASSESSMENT — PAIN SCALES - GENERAL: PAINLEVEL_OUTOF10: 0

## 2023-12-06 ENCOUNTER — HOSPITAL ENCOUNTER (OUTPATIENT)
Facility: HOSPITAL | Age: 88
Discharge: HOME | End: 2023-12-06
Attending: EMERGENCY MEDICINE
Payer: MEDICARE

## 2023-12-06 VITALS
DIASTOLIC BLOOD PRESSURE: 67 MMHG | TEMPERATURE: 97.9 F | RESPIRATION RATE: 18 BRPM | HEART RATE: 58 BPM | SYSTOLIC BLOOD PRESSURE: 145 MMHG

## 2023-12-06 DIAGNOSIS — L97.912 ULCER OF RIGHT LOWER EXTREMITY WITH FAT LAYER EXPOSED (HCC): Primary | ICD-10-CM

## 2023-12-06 PROCEDURE — 11043 DBRDMT MUSC&/FSCA 1ST 20/<: CPT

## 2023-12-06 RX ORDER — SODIUM CHLOR/HYPOCHLOROUS ACID 0.033 %
SOLUTION, IRRIGATION IRRIGATION ONCE
OUTPATIENT
Start: 2023-12-06 | End: 2023-12-06

## 2023-12-06 RX ORDER — TRIAMCINOLONE ACETONIDE 1 MG/G
OINTMENT TOPICAL ONCE
OUTPATIENT
Start: 2023-12-06 | End: 2023-12-06

## 2023-12-06 RX ORDER — LIDOCAINE 50 MG/G
OINTMENT TOPICAL ONCE
OUTPATIENT
Start: 2023-12-06 | End: 2023-12-06

## 2023-12-06 RX ORDER — GINSENG 100 MG
CAPSULE ORAL ONCE
OUTPATIENT
Start: 2023-12-06 | End: 2023-12-06

## 2023-12-06 RX ORDER — LIDOCAINE 40 MG/G
CREAM TOPICAL ONCE
OUTPATIENT
Start: 2023-12-06 | End: 2023-12-06

## 2023-12-06 RX ORDER — LIDOCAINE HYDROCHLORIDE 20 MG/ML
JELLY TOPICAL ONCE
OUTPATIENT
Start: 2023-12-06 | End: 2023-12-06

## 2023-12-06 RX ORDER — BACITRACIN ZINC AND POLYMYXIN B SULFATE 500; 1000 [USP'U]/G; [USP'U]/G
OINTMENT TOPICAL ONCE
OUTPATIENT
Start: 2023-12-06 | End: 2023-12-06

## 2023-12-06 RX ORDER — CLOBETASOL PROPIONATE 0.5 MG/G
OINTMENT TOPICAL ONCE
OUTPATIENT
Start: 2023-12-06 | End: 2023-12-06

## 2023-12-06 RX ORDER — IBUPROFEN 200 MG
TABLET ORAL ONCE
OUTPATIENT
Start: 2023-12-06 | End: 2023-12-06

## 2023-12-06 RX ORDER — GENTAMICIN SULFATE 1 MG/G
OINTMENT TOPICAL ONCE
OUTPATIENT
Start: 2023-12-06 | End: 2023-12-06

## 2023-12-06 RX ORDER — LIDOCAINE HYDROCHLORIDE 40 MG/ML
SOLUTION TOPICAL ONCE
OUTPATIENT
Start: 2023-12-06 | End: 2023-12-06

## 2023-12-06 RX ORDER — BETAMETHASONE DIPROPIONATE 0.5 MG/G
CREAM TOPICAL ONCE
OUTPATIENT
Start: 2023-12-06 | End: 2023-12-06

## 2023-12-06 NOTE — FLOWSHEET NOTE
12/06/23 1211   Right Leg Edema Point of Measurement   Compression Therapy Tubular elastic support bandage   Tubular Elastic Support Bandage Compression Pressure Low   Wound 11/22/23 Arm Left;Upper #2 clustered   Date First Assessed/Time First Assessed: 11/22/23 1419   Present on Original Admission: Yes  Primary Wound Type: Traumatic  Location: Arm  Wound Location Orientation: Left;Upper  Wound Description (Comments): #2 clustered   Dressing/Treatment Non adherent;Gauze dressing/dressing sponge   Wound 11/08/23 Leg Right;Lateral;Lower #1   Date First Assessed/Time First Assessed: 11/08/23 1334   Present on Original Admission: Yes  Location: Leg  Wound Location Orientation: Right;Lateral;Lower  Wound Description (Comments): #1   Dressing/Treatment ABD; Alginate with Ag;Gauze dressing/dressing sponge;Roll gauze  (super absober)     Discharge Condition: Stable    Pain: 0    Ambulatory Status: None    Discharge Destination: home    Transportation:car    Accompanied by: FAMILY    Discharge instructions reviewed with CAREGIVER and copy or written instructions have been provided. All questions/concerns have been addressed at this time.

## 2023-12-06 NOTE — PROGRESS NOTES
Assessment Pink/red;Slough; Epithelialization 12/06/23 1103   Drainage Amount Moderate (25-50%) 12/06/23 1103   Drainage Description Serosanguinous 12/06/23 1103   Odor None 12/06/23 1103   Pratima-wound Assessment Fragile; Hyperpigmented 12/06/23 1103   Margins Flat/open edges; Undefined edges 12/06/23 1103   Number of days: 13               I have noted, and reviewed today's data for this patient in [address] and concur with same. The focused physical exam, other physical findings, Medical history, Review of Symptoms and Medications today remains unchanged except as noted below. Patient notes today: doing good no pain today, had Dalvance yesterday. Lesion/Wound, focused exam on Presentation today:   A. LUE poterior upper arm ulcer now with some crust and scale, two small ulcerated sites at edge of skin flap healing line, thin slough over open surface  B. RLE lateral mid calf ulcer/skin tear dehiscence, thick slough over open ulcer bed, pink skin edges. Procedure:   Ulcer Type: traumatic  Consent obtained: Yes  Time out taken: Yes    Wound,(s)# AB. Procedure name: sharp excisional debridement. Anaesthesia: Lidocaine; topical    Description: using a sharp curette I excised all non viable tissue to effect a clean bleeding base. Tissue Level/depth of debridement: fascia. Post debridement dimensions changed as noted:    Depth, add 1.0 mm;    Width, add 0.0 mm   Length, add 0.0 mm. Blood Loss: 3 CCs. Bleeding abated post treatment . Post Procedure Condition/ Diagnosis: LUE nearly closed, RLE now can close and tolerate collagen. Follow up and Future plans today: RTC 2 week, add med to CLARISSE Marc Specimens: 0. Patient Counseled regarding/Discussed: very much improved. Clinical Considerations: alert to infection, ok to shower, no scrubbing . Dx Codes: I04.569; Z00.610.     Kelly De Guzman MD  12/6/2023

## 2023-12-06 NOTE — FLOWSHEET NOTE
12/06/23 1103   Anesthetic   Anesthetic 4% Lidocaine Liquid Topical   Right Leg Edema Point of Measurement   Leg circumference 32.2 cm   Ankle circumference 21 cm   RLE Neurovascular Assessment   Capillary Refill Less than/Equal to 3 seconds   Color Appropriate for Ethnicity   Temperature Cool   R Pedal Pulse +1   Wound 11/22/23 Arm Left;Upper #2 clustered   Date First Assessed/Time First Assessed: 11/22/23 1419   Present on Original Admission: Yes  Primary Wound Type: Traumatic  Location: Arm  Wound Location Orientation: Left;Upper  Wound Description (Comments): #2 clustered   Wound Image    Dressing Status Old drainage noted   Wound Cleansed Cleansed with saline   Wound Length (cm) 2.3 cm   Wound Width (cm) 2 cm   Wound Depth (cm) 0.1 cm   Wound Surface Area (cm^2) 4.6 cm^2   Change in Wound Size % (l*w) 93.43   Wound Volume (cm^3) 0.46 cm^3   Wound Healing % 93   Wound Assessment Pink/red;Slough; Epithelialization   Drainage Amount Moderate (25-50%)   Drainage Description Serosanguinous   Odor None   Pratima-wound Assessment Fragile; Hyperpigmented   Margins Flat/open edges; Undefined edges   Wound 11/22/23 Elbow Right #3   Date First Assessed/Time First Assessed: 11/22/23 1419   Present on Original Admission: Yes  Primary Wound Type: Traumatic  Location: Elbow  Wound Location Orientation: Right  Wound Description (Comments): #3   Wound Image    Wound Cleansed Not Cleansed   Wound Length (cm) 0 cm   Wound Width (cm) 0 cm   Wound Depth (cm) 0 cm   Wound Surface Area (cm^2) 0 cm^2   Change in Wound Size % (l*w) 100   Wound Volume (cm^3) 0 cm^3   Wound Healing % 100   Wound Assessment Epithelialization   Drainage Amount None (dry)   Odor None   Pratima-wound Assessment Intact   Margins Attached edges   Wound 11/08/23 Leg Right;Lateral;Lower #1   Date First Assessed/Time First Assessed: 11/08/23 1334   Present on Original Admission: Yes  Location: Leg  Wound Location Orientation: Right;Lateral;Lower  Wound Description

## 2023-12-06 NOTE — PATIENT INSTRUCTIONS
Discharge Instructions for  [address]  Telephone: [phone]    NAME:  Yovany Perez OF BIRTH:  1/9/1930  ACCOUNT NUMBER :  [de-identified]  DATE:  12/06/2023     : Adryan Segrua, UGO     Supplies: Prism    Wound Cleansing:   Do not scrub or use excessive force. Cleanse wound prior to applying a clean dressing with:      [x] Cleanse wound with: BABY SHAMPOO LATHER  LEAVE on 1-2 MINUTES ON WOUND THEN RINSE WITH WATER OR SALINE    [] May Shower at Discharge     [] Shower on days of dressing change, remove dressing first    [] Keep Wound Dry in Shower (may purchase a cast cover if needed)      Dressings:           Wound Location Right lateral lower leg wound    Apply Primary Dressing: Mupirocin        [] MediHoney Gel    [] MediHoney Alginate  [] Alginate     [] Alginate with Silver       [] Collagen   [x] Collagen with Silver to wound base first then cover with [x] Alginate with Silver     [] Hydrocolloid  [] Hydrofera Blue Classic (moisten with saline)  [] Hydrofera Blue Ready  [] Other:      Cover and Secure with:    [x] Gauze   [] Roosevelt   [x] Kerlix  [] Ace Wrap     [] ABD  [] Medipore tape  [x] tape  [x] Other: super absorber    Avoid contact of tape with skin.     Change dressing:   [] Daily      [x] Every Other Day   [] Three times per week  [] Once a week   [] Do Not Change Dressing     [] Other:    Dressings:           Wound Location Left upper arm   Apply Primary Dressing: Mupirocin topical STOP        [] MediHoney Gel    [] MediHoney Alginate  [] Alginate     [] Alginate with Silver       [] Collagen   [] Collagen with Silver     [] Hydrocolloid  [] Hydrofera Blue Classic (moisten with saline)  [] Hydrofera Blue Ready  [x] Other: adaptic, gauze, roll gauze, tape, tubi size g upper and size f lower arm make into a glove     Change dressing:   [] Daily      [x] Every Other Day   [] Three times per week  []

## 2024-01-09 ENCOUNTER — TELEPHONE (OUTPATIENT)
Age: 89
End: 2024-01-09

## 2024-01-10 ENCOUNTER — HOSPITAL ENCOUNTER (OUTPATIENT)
Facility: HOSPITAL | Age: 89
Discharge: HOME | End: 2024-01-10
Attending: EMERGENCY MEDICINE
Payer: MEDICARE

## 2024-01-10 VITALS
HEART RATE: 80 BPM | TEMPERATURE: 97.9 F | DIASTOLIC BLOOD PRESSURE: 73 MMHG | RESPIRATION RATE: 16 BRPM | SYSTOLIC BLOOD PRESSURE: 170 MMHG

## 2024-01-10 DIAGNOSIS — L97.912 ULCER OF RIGHT LOWER EXTREMITY WITH FAT LAYER EXPOSED (HCC): Primary | ICD-10-CM

## 2024-01-10 PROCEDURE — 99212 OFFICE O/P EST SF 10 MIN: CPT

## 2024-01-10 RX ORDER — LIDOCAINE 40 MG/G
CREAM TOPICAL ONCE
Status: CANCELLED | OUTPATIENT
Start: 2024-01-10 | End: 2024-01-10

## 2024-01-10 RX ORDER — LIDOCAINE HYDROCHLORIDE 20 MG/ML
JELLY TOPICAL ONCE
Status: CANCELLED | OUTPATIENT
Start: 2024-01-10 | End: 2024-01-10

## 2024-01-10 RX ORDER — BACITRACIN ZINC AND POLYMYXIN B SULFATE 500; 1000 [USP'U]/G; [USP'U]/G
OINTMENT TOPICAL ONCE
Status: CANCELLED | OUTPATIENT
Start: 2024-01-10 | End: 2024-01-10

## 2024-01-10 RX ORDER — GENTAMICIN SULFATE 1 MG/G
OINTMENT TOPICAL ONCE
Status: CANCELLED | OUTPATIENT
Start: 2024-01-10 | End: 2024-01-10

## 2024-01-10 RX ORDER — IBUPROFEN 200 MG
TABLET ORAL ONCE
Status: CANCELLED | OUTPATIENT
Start: 2024-01-10 | End: 2024-01-10

## 2024-01-10 RX ORDER — LIDOCAINE HYDROCHLORIDE 40 MG/ML
SOLUTION TOPICAL ONCE
Status: CANCELLED | OUTPATIENT
Start: 2024-01-10 | End: 2024-01-10

## 2024-01-10 RX ORDER — CLOBETASOL PROPIONATE 0.5 MG/G
OINTMENT TOPICAL ONCE
Status: CANCELLED | OUTPATIENT
Start: 2024-01-10 | End: 2024-01-10

## 2024-01-10 RX ORDER — GINSENG 100 MG
CAPSULE ORAL ONCE
Status: CANCELLED | OUTPATIENT
Start: 2024-01-10 | End: 2024-01-10

## 2024-01-10 RX ORDER — LIDOCAINE 50 MG/G
OINTMENT TOPICAL ONCE
Status: CANCELLED | OUTPATIENT
Start: 2024-01-10 | End: 2024-01-10

## 2024-01-10 RX ORDER — BETAMETHASONE DIPROPIONATE 0.5 MG/G
CREAM TOPICAL ONCE
Status: CANCELLED | OUTPATIENT
Start: 2024-01-10 | End: 2024-01-10

## 2024-01-10 RX ORDER — TRIAMCINOLONE ACETONIDE 1 MG/G
OINTMENT TOPICAL ONCE
Status: CANCELLED | OUTPATIENT
Start: 2024-01-10 | End: 2024-01-10

## 2024-01-10 RX ORDER — SODIUM CHLOR/HYPOCHLOROUS ACID 0.033 %
SOLUTION, IRRIGATION IRRIGATION ONCE
Status: CANCELLED | OUTPATIENT
Start: 2024-01-10 | End: 2024-01-10

## 2024-01-10 NOTE — PATIENT INSTRUCTIONS
Discharge Instructions for  [facility]  [address] [place] [address]  Telephone: [phone]   FAX [phone]    NAME:  David Delarosa  YOB: 1930  ACCOUNT NUMBER :  [id number]  DATE:  01/10/2024     : Sandi NEIL RN    Supplies: Prism    Wound Cleansing:   Do not scrub or use excessive force.  Cleanse wound prior to applying a clean dressing with:      [x] Cleanse wound with: BABY SHAMPOO LATHER  LEAVE on 1-2 MINUTES ON WOUND THEN RINSE WITH WATER OR SALINE       Dressings:           Wound Location Right lateral lower leg wound    Apply Primary Dressing:       Mepliex foam boarder, please give patient one extra, thank you!      Activity:  Activity as tolerated:    [x] Patient has no activity restrictions                                     [facility] THANK YOU    Your treatment has been completed at [facility] outpatient wound clinic on 1/10/2024, per Dr. Klein.    We know patients have several options when choosing a health care provider. We would like to express our sincere appreciation for having had the chance to be yours. More importantly, we enjoy having you as part of our family.     We also hope your experience with us has surpassed your expectations and that you have been pleased with our service. We appreciate the confidence you've shown in selecting us as your health care provider and we will continue or commitment to provide the highest quality of service.      Again, thank you for choosing us for your health care needs. If you have any further questions or concerns, please call [phone]. It has been our pleasure serving you and we hope to continue our relationship in the future, if the need occurs.     Sincerely,  [facility] Team     Physician Signature:_______________________ Dr. John Klein

## 2024-01-10 NOTE — FLOWSHEET NOTE
01/10/24 1012   Anesthetic   Anesthetic 4% Lidocaine Liquid Topical   Right Leg Edema Point of Measurement   Leg circumference 33.2 cm   Ankle circumference 23.6 cm   RLE Neurovascular Assessment   Capillary Refill Greater than 3 seconds   Color Appropriate for Ethnicity   Temperature Cool   R Pedal Pulse +2   Wound 11/08/23 Leg Right;Lateral;Lower #1   Date First Assessed/Time First Assessed: 11/08/23 1334   Present on Original Admission: Yes  Location: Leg  Wound Location Orientation: Right;Lateral;Lower  Wound Description (Comments): #1   Wound Image    Wound Cleansed Cleansed with saline   Wound Length (cm) 0.1 cm   Wound Width (cm) 0.1 cm   Wound Depth (cm) 0.1 cm   Wound Surface Area (cm^2) 0.01 cm^2   Change in Wound Size % (l*w) 99.97   Wound Volume (cm^3) 0.001 cm^3   Wound Healing % 100   Wound Assessment Dry  (dried exudate)   Drainage Amount None (dry)   Odor None   Pratima-wound Assessment Blanchable erythema;Hyperpigmented   Margins Attached edges     BP (!) 170/73   Pulse 80   Temp 97.9 °F (36.6 °C) (Temporal)   Resp 16

## 2024-01-10 NOTE — PROGRESS NOTES
[facility]   Progress Note and Procedure Note   NO Procedure    Patient Name: David Delarosa  : 1930  MRN: [id number]    Past Medical History:   Diagnosis Date    DJD (degenerative joint disease) of cervical spine     DJD (degenerative joint disease) of hip     Hyperlipidemia     Ill-defined condition 2016    Pneumonia    Mobitz (type) II atrioventricular block     Pacemaker     Sinoatrial node dysfunction (HCC)     Vertigo     pt states it's a side effect from Covid 2020     Past Surgical History:   Procedure Laterality Date    GEN INSERT/REPLACE ONLY DUAL  8/3/2016         HERNIA REPAIR Right     Inguinal Hernia    MOHS SURGERY      PACEMAKER   &     Right Upper Chest    ROTATOR CUFF REPAIR Right  &     TOTAL KNEE ARTHROPLASTY Left 2016    Partial Knee Replacement    UROLOGICAL SURGERY      TURP-for BPH     No family history on file.  Social History     Tobacco Use    Smoking status: Never    Smokeless tobacco: Never   Substance Use Topics    Alcohol use: No     Alcohol/week: 0.0 standard drinks of alcohol    Drug use: No     No Known Allergies  Current Outpatient Medications on File Prior to Encounter   Medication Sig Dispense Refill    ELIQUIS 5 MG TABS tablet TAKE 1 TABLET BY MOUTH  TWICE DAILY 180 tablet 3    DULoxetine (CYMBALTA) 30 MG extended release capsule Take 1 capsule by mouth daily      predniSONE (DELTASONE) 10 MG tablet Take 1 tablet by mouth daily      simvastatin (ZOCOR) 40 MG tablet Take 1 tablet by mouth nightly       No current facility-administered medications on file prior to encounter.     No results found for: \"LABA1C\"      Vitals:    01/10/24 1010   BP: (!) 170/73   Pulse: 80   Resp: 16   Temp: 97.9 °F (36.6 °C)                   I have noted, and reviewed today's data for this patient in [facility] and concur with same. The focused physical exam, other physical findings, Medical history, Review of Symptoms and Medications today

## 2024-01-11 ENCOUNTER — TELEPHONE (OUTPATIENT)
Age: 89
End: 2024-01-11

## 2024-01-11 NOTE — TELEPHONE ENCOUNTER
You can call him and tell him his test results were not concerning for anything that could be causing his vertigo. Please ask if he ever started the PT for dizziness that Fidel ordered in November and if he hasn't, he should do that. No need to come to the office.      He has not started PT for his dizziness and vertigo the patient stated he has vertigo so bad that he is afraid sometimes of taking one foot in front of the other and he doesn't feel that it would help him. I did advise that we refer for this diagnosis a lot and sometimes it is beneficial for the patient however he disagreed. He was very pleasant and was thankful for calling about appt tomorrow.

## 2024-02-15 ENCOUNTER — HOSPITAL ENCOUNTER (INPATIENT)
Facility: HOSPITAL | Age: 89
LOS: 2 days | Discharge: HOME | End: 2024-02-17
Attending: EMERGENCY MEDICINE | Admitting: INTERNAL MEDICINE
Payer: MEDICARE

## 2024-02-15 ENCOUNTER — APPOINTMENT (OUTPATIENT)
Facility: HOSPITAL | Age: 89
End: 2024-02-15
Payer: MEDICARE

## 2024-02-15 DIAGNOSIS — S09.90XA INJURY OF HEAD, INITIAL ENCOUNTER: ICD-10-CM

## 2024-02-15 DIAGNOSIS — R26.2 UNABLE TO WALK: ICD-10-CM
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
## 2024-02-15 DIAGNOSIS — S32.592A CLOSED FRACTURE OF LEFT INFERIOR PUBIC RAMUS, INITIAL ENCOUNTER (HCC): Primary | ICD-10-CM

## 2024-02-15 PROBLEM — S32.9XXA CLOSED FRACTURE OF LEFT PELVIS, INITIAL ENCOUNTER (HCC): Status: ACTIVE | Noted: 2024-02-15

## 2024-02-15 LAB
ALBUMIN SERPL-MCNC: 3.1 G/DL (ref 3.5–5)
ALBUMIN/GLOB SERPL: 1 (ref 1.1–2.2)
ALP SERPL-CCNC: 80 U/L (ref 45–117)
ALT SERPL-CCNC: 22 U/L (ref 12–78)
ANION GAP SERPL CALC-SCNC: 2 MMOL/L (ref 5–15)
ANION GAP SERPL CALC-SCNC: 5 MMOL/L (ref 5–15)
AST SERPL-CCNC: 21 U/L (ref 15–37)
BASOPHILS # BLD: 0 K/UL (ref 0–0.1)
BASOPHILS NFR BLD: 0 % (ref 0–1)
BILIRUB SERPL-MCNC: 0.9 MG/DL (ref 0.2–1)
BUN SERPL-MCNC: 25 MG/DL (ref 6–20)
BUN SERPL-MCNC: 26 MG/DL (ref 6–20)
BUN/CREAT SERPL: 21 (ref 12–20)
BUN/CREAT SERPL: 23 (ref 12–20)
CALCIUM SERPL-MCNC: 8.9 MG/DL (ref 8.5–10.1)
CALCIUM SERPL-MCNC: 8.9 MG/DL (ref 8.5–10.1)
CHLORIDE SERPL-SCNC: 106 MMOL/L (ref 97–108)
CHLORIDE SERPL-SCNC: 109 MMOL/L (ref 97–108)
CO2 SERPL-SCNC: 28 MMOL/L (ref 21–32)
CO2 SERPL-SCNC: 28 MMOL/L (ref 21–32)
CREAT SERPL-MCNC: 1.12 MG/DL (ref 0.7–1.3)
CREAT SERPL-MCNC: 1.21 MG/DL (ref 0.7–1.3)
DIFFERENTIAL METHOD BLD: ABNORMAL
EOSINOPHIL # BLD: 0 K/UL (ref 0–0.4)
EOSINOPHIL NFR BLD: 0 % (ref 0–7)
ERYTHROCYTE [DISTWIDTH] IN BLOOD BY AUTOMATED COUNT: 14.8 % (ref 11.5–14.5)
ERYTHROCYTE [DISTWIDTH] IN BLOOD BY AUTOMATED COUNT: 14.9 % (ref 11.5–14.5)
GLOBULIN SER CALC-MCNC: 3 G/DL (ref 2–4)
GLUCOSE SERPL-MCNC: 103 MG/DL (ref 65–100)
GLUCOSE SERPL-MCNC: 142 MG/DL (ref 65–100)
HCT VFR BLD AUTO: 42 % (ref 36.6–50.3)
HCT VFR BLD AUTO: 42.9 % (ref 36.6–50.3)
HGB BLD-MCNC: 13.6 G/DL (ref 12.1–17)
HGB BLD-MCNC: 13.7 G/DL (ref 12.1–17)
IMM GRANULOCYTES # BLD AUTO: 0.1 K/UL (ref 0–0.04)
IMM GRANULOCYTES NFR BLD AUTO: 1 % (ref 0–0.5)
LYMPHOCYTES # BLD: 0.7 K/UL (ref 0.8–3.5)
LYMPHOCYTES NFR BLD: 6 % (ref 12–49)
MAGNESIUM SERPL-MCNC: 2 MG/DL (ref 1.6–2.4)
MCH RBC QN AUTO: 29.1 PG (ref 26–34)
MCH RBC QN AUTO: 29.2 PG (ref 26–34)
MCHC RBC AUTO-ENTMCNC: 31.9 G/DL (ref 30–36.5)
MCHC RBC AUTO-ENTMCNC: 32.4 G/DL (ref 30–36.5)
MCV RBC AUTO: 89.9 FL (ref 80–99)
MCV RBC AUTO: 91.5 FL (ref 80–99)
MONOCYTES # BLD: 1 K/UL (ref 0–1)
MONOCYTES NFR BLD: 8 % (ref 5–13)
NEUTS SEG # BLD: 10.4 K/UL (ref 1.8–8)
NEUTS SEG NFR BLD: 85 % (ref 32–75)
NRBC # BLD: 0 K/UL (ref 0–0.01)
NRBC # BLD: 0 K/UL (ref 0–0.01)
NRBC BLD-RTO: 0 PER 100 WBC
NRBC BLD-RTO: 0 PER 100 WBC
PLATELET # BLD AUTO: 260 K/UL (ref 150–400)
PLATELET # BLD AUTO: 262 K/UL (ref 150–400)
PMV BLD AUTO: 10.4 FL (ref 8.9–12.9)
PMV BLD AUTO: 10.6 FL (ref 8.9–12.9)
POTASSIUM SERPL-SCNC: 4.5 MMOL/L (ref 3.5–5.1)
POTASSIUM SERPL-SCNC: 4.9 MMOL/L (ref 3.5–5.1)
PROT SERPL-MCNC: 6.1 G/DL (ref 6.4–8.2)
RBC # BLD AUTO: 4.67 M/UL (ref 4.1–5.7)
RBC # BLD AUTO: 4.69 M/UL (ref 4.1–5.7)
RBC MORPH BLD: ABNORMAL
SODIUM SERPL-SCNC: 139 MMOL/L (ref 136–145)
SODIUM SERPL-SCNC: 139 MMOL/L (ref 136–145)
WBC # BLD AUTO: 12.2 K/UL (ref 4.1–11.1)
WBC # BLD AUTO: 9.7 K/UL (ref 4.1–11.1)

## 2024-02-15 PROCEDURE — 2060000000 HC ICU INTERMEDIATE R&B

## 2024-02-15 PROCEDURE — 36415 COLL VENOUS BLD VENIPUNCTURE: CPT

## 2024-02-15 PROCEDURE — 96372 THER/PROPH/DIAG INJ SC/IM: CPT

## 2024-02-15 PROCEDURE — 70450 CT HEAD/BRAIN W/O DYE: CPT

## 2024-02-15 PROCEDURE — 72192 CT PELVIS W/O DYE: CPT

## 2024-02-15 PROCEDURE — 85025 COMPLETE CBC W/AUTO DIFF WBC: CPT

## 2024-02-15 PROCEDURE — 99222 1ST HOSP IP/OBS MODERATE 55: CPT | Performed by: NURSE PRACTITIONER

## 2024-02-15 PROCEDURE — 83735 ASSAY OF MAGNESIUM: CPT

## 2024-02-15 PROCEDURE — 73562 X-RAY EXAM OF KNEE 3: CPT

## 2024-02-15 PROCEDURE — 2580000003 HC RX 258: Performed by: INTERNAL MEDICINE

## 2024-02-15 PROCEDURE — 85027 COMPLETE CBC AUTOMATED: CPT

## 2024-02-15 PROCEDURE — 99285 EMERGENCY DEPT VISIT HI MDM: CPT

## 2024-02-15 PROCEDURE — 6370000000 HC RX 637 (ALT 250 FOR IP): Performed by: INTERNAL MEDICINE

## 2024-02-15 PROCEDURE — 80053 COMPREHEN METABOLIC PANEL: CPT

## 2024-02-15 PROCEDURE — 6360000002 HC RX W HCPCS: Performed by: EMERGENCY MEDICINE

## 2024-02-15 PROCEDURE — 73080 X-RAY EXAM OF ELBOW: CPT

## 2024-02-15 PROCEDURE — 73502 X-RAY EXAM HIP UNI 2-3 VIEWS: CPT

## 2024-02-15 PROCEDURE — 93005 ELECTROCARDIOGRAM TRACING: CPT

## 2024-02-15 RX ORDER — DULOXETIN HYDROCHLORIDE 30 MG/1
30 CAPSULE, DELAYED RELEASE ORAL DAILY
Status: DISCONTINUED | OUTPATIENT
Start: 2024-02-16 | End: 2024-02-17 | Stop reason: HOSPADM

## 2024-02-15 RX ORDER — ACETAMINOPHEN 650 MG/1
650 SUPPOSITORY RECTAL EVERY 6 HOURS PRN
Status: DISCONTINUED | OUTPATIENT
Start: 2024-02-15 | End: 2024-02-17 | Stop reason: HOSPADM

## 2024-02-15 RX ORDER — SENNA AND DOCUSATE SODIUM 50; 8.6 MG/1; MG/1
1 TABLET, FILM COATED ORAL 2 TIMES DAILY
Status: DISCONTINUED | OUTPATIENT
Start: 2024-02-15 | End: 2024-02-17 | Stop reason: HOSPADM

## 2024-02-15 RX ORDER — SODIUM CHLORIDE 0.9 % (FLUSH) 0.9 %
5-40 SYRINGE (ML) INJECTION EVERY 12 HOURS SCHEDULED
Status: DISCONTINUED | OUTPATIENT
Start: 2024-02-15 | End: 2024-02-17 | Stop reason: HOSPADM

## 2024-02-15 RX ORDER — DIAZEPAM 2 MG/1
2 TABLET ORAL EVERY 6 HOURS PRN
Status: DISCONTINUED | OUTPATIENT
Start: 2024-02-15 | End: 2024-02-17 | Stop reason: HOSPADM

## 2024-02-15 RX ORDER — ATORVASTATIN CALCIUM 20 MG/1
20 TABLET, FILM COATED ORAL DAILY
Status: DISCONTINUED | OUTPATIENT
Start: 2024-02-16 | End: 2024-02-17 | Stop reason: HOSPADM

## 2024-02-15 RX ORDER — PROCHLORPERAZINE EDISYLATE 5 MG/ML
10 INJECTION INTRAMUSCULAR; INTRAVENOUS EVERY 6 HOURS PRN
Status: DISCONTINUED | OUTPATIENT
Start: 2024-02-15 | End: 2024-02-17 | Stop reason: HOSPADM

## 2024-02-15 RX ORDER — FENTANYL CITRATE 50 UG/ML
50 INJECTION, SOLUTION INTRAMUSCULAR; INTRAVENOUS ONCE
Status: COMPLETED | OUTPATIENT
Start: 2024-02-15 | End: 2024-02-15

## 2024-02-15 RX ORDER — OXYCODONE HYDROCHLORIDE 5 MG/1
5 TABLET ORAL EVERY 4 HOURS PRN
Status: DISCONTINUED | OUTPATIENT
Start: 2024-02-15 | End: 2024-02-17 | Stop reason: HOSPADM

## 2024-02-15 RX ORDER — ONDANSETRON 2 MG/ML
4 INJECTION INTRAMUSCULAR; INTRAVENOUS EVERY 6 HOURS PRN
Status: DISCONTINUED | OUTPATIENT
Start: 2024-02-15 | End: 2024-02-17 | Stop reason: HOSPADM

## 2024-02-15 RX ORDER — POLYETHYLENE GLYCOL 3350 17 G/17G
17 POWDER, FOR SOLUTION ORAL DAILY PRN
Status: DISCONTINUED | OUTPATIENT
Start: 2024-02-15 | End: 2024-02-17 | Stop reason: HOSPADM

## 2024-02-15 RX ORDER — PREDNISONE 5 MG/1
5 TABLET ORAL DAILY
Status: DISCONTINUED | OUTPATIENT
Start: 2024-02-16 | End: 2024-02-17 | Stop reason: HOSPADM

## 2024-02-15 RX ORDER — SODIUM CHLORIDE 0.9 % (FLUSH) 0.9 %
5-40 SYRINGE (ML) INJECTION PRN
Status: DISCONTINUED | OUTPATIENT
Start: 2024-02-15 | End: 2024-02-17 | Stop reason: HOSPADM

## 2024-02-15 RX ORDER — SODIUM CHLORIDE 9 MG/ML
INJECTION, SOLUTION INTRAVENOUS PRN
Status: DISCONTINUED | OUTPATIENT
Start: 2024-02-15 | End: 2024-02-17 | Stop reason: HOSPADM

## 2024-02-15 RX ORDER — MORPHINE SULFATE 4 MG/ML
4 INJECTION, SOLUTION INTRAMUSCULAR; INTRAVENOUS
Status: COMPLETED | OUTPATIENT
Start: 2024-02-15 | End: 2024-02-15

## 2024-02-15 RX ORDER — ACETAMINOPHEN 325 MG/1
650 TABLET ORAL EVERY 6 HOURS PRN
Status: DISCONTINUED | OUTPATIENT
Start: 2024-02-15 | End: 2024-02-17 | Stop reason: HOSPADM

## 2024-02-15 RX ADMIN — SODIUM CHLORIDE, PRESERVATIVE FREE 10 ML: 5 INJECTION INTRAVENOUS at 20:25

## 2024-02-15 RX ADMIN — FENTANYL CITRATE 50 MCG: 50 INJECTION, SOLUTION INTRAMUSCULAR; INTRAVENOUS at 11:14

## 2024-02-15 RX ADMIN — DOCUSATE SODIUM 50MG AND SENNOSIDES 8.6MG 1 TABLET: 8.6; 5 TABLET, FILM COATED ORAL at 15:05

## 2024-02-15 RX ADMIN — MORPHINE SULFATE 4 MG: 4 INJECTION, SOLUTION INTRAMUSCULAR; INTRAVENOUS at 10:12

## 2024-02-15 RX ADMIN — APIXABAN 5 MG: 5 TABLET, FILM COATED ORAL at 20:25

## 2024-02-15 RX ADMIN — DOCUSATE SODIUM 50MG AND SENNOSIDES 8.6MG 1 TABLET: 8.6; 5 TABLET, FILM COATED ORAL at 20:25

## 2024-02-15 ASSESSMENT — PAIN SCALES - GENERAL
PAINLEVEL_OUTOF10: 8
PAINLEVEL_OUTOF10: 9

## 2024-02-15 ASSESSMENT — PAIN DESCRIPTION - FREQUENCY
FREQUENCY: CONTINUOUS
FREQUENCY: CONTINUOUS

## 2024-02-15 ASSESSMENT — PAIN DESCRIPTION - ORIENTATION
ORIENTATION: LEFT

## 2024-02-15 ASSESSMENT — PAIN DESCRIPTION - ONSET
ONSET: ON-GOING
ONSET: ON-GOING

## 2024-02-15 ASSESSMENT — PAIN - FUNCTIONAL ASSESSMENT
PAIN_FUNCTIONAL_ASSESSMENT: PREVENTS OR INTERFERES WITH MANY ACTIVE NOT PASSIVE ACTIVITIES
PAIN_FUNCTIONAL_ASSESSMENT: 0-10
PAIN_FUNCTIONAL_ASSESSMENT: PREVENTS OR INTERFERES SOME ACTIVE ACTIVITIES AND ADLS

## 2024-02-15 ASSESSMENT — LIFESTYLE VARIABLES
HOW OFTEN DO YOU HAVE A DRINK CONTAINING ALCOHOL: NEVER
HOW MANY STANDARD DRINKS CONTAINING ALCOHOL DO YOU HAVE ON A TYPICAL DAY: PATIENT DOES NOT DRINK

## 2024-02-15 ASSESSMENT — PAIN DESCRIPTION - LOCATION
LOCATION: HIP;KNEE
LOCATION: HIP

## 2024-02-15 ASSESSMENT — PAIN DESCRIPTION - DESCRIPTORS
DESCRIPTORS: ACHING

## 2024-02-15 ASSESSMENT — PAIN DESCRIPTION - PAIN TYPE
TYPE: ACUTE PAIN
TYPE: ACUTE PAIN

## 2024-02-15 NOTE — ED TRIAGE NOTES
Pt arrives ambulatory via POV w/ c/c of GLF yesterday evening while taking out the trash. He reports the garbage can \"got away from [him].\" Pt denies LOC. Reports he did hit the L side of his head & reports he is on Eliquis for AF. Reports abrasion to LUE as well. Reports L hip pain & difficulty w/ weight bearing. Pt pivoted from w/c to stretcher & tolerated well.

## 2024-02-15 NOTE — ED NOTES
Pt assisted OOB to attempt ambulation. Pt tearful, reporting continued L hip pain & stated that he cannot walk. Pt walked 2 steps & was assisted back to bed. Pt reports he is miserable d/t his vertigo, reports nothing has helped w/ this in the past. Pt lying in bed w/ wife @ bedside & call light in reach. Notified MD Aguilar.

## 2024-02-15 NOTE — PLAN OF CARE
Problem: Discharge Planning  Goal: Discharge to home or other facility with appropriate resources  Outcome: /[facility] Progressing     Problem: Pain  Goal: Verbalizes/displays adequate comfort level or baseline comfort level  Outcome: /[facility] Progressing     Problem: Skin/Tissue Integrity  Goal: Absence of new skin breakdown  Description: 1.  Monitor for areas of redness and/or skin breakdown  2.  Assess vascular access sites hourly  3.  Every 4-6 hours minimum:  Change oxygen saturation probe site  4.  Every 4-6 hours:  If on nasal continuous positive airway pressure, respiratory therapy assess nares and determine need for appliance change or resting period.  Outcome: /[facility] Progressing     Problem: Safety - Adult  Goal: Free from fall injury  Outcome: /[facility] Progressing     Problem: ABCDS Injury Assessment  Goal: Absence of physical injury  Outcome: /[facility] Progressing

## 2024-02-15 NOTE — CONSULTS
VIEWS) [[id number]]    Collected: 02/15/24 1013    Updated: 02/15/24 1017    Narrative:     EXAM: XR HIP 2-3 VW W PELVIS LEFT,     EXAM: XR KNEE LEFT (3 VIEWS)     INDICATION: fall, inability to walk     COMPARISON: None.     TECHNIQUE: See below     FINDINGS:     Diffuse osteopenia limits evaluation.     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 No malalignment.     Left knee, 3 views   Revised total knee arthroplasty. No definite acute fractures nor dislocations.   No definite joint effusion.    Impression:       1.  Diffuse osteopenia limits evaluation.   2.  Age-indeterminate left inferior pubic ramus deformity.   3.  Left revised total knee arthroplasty, without definite fracture.                          Labs:   Recent Results (from the past 24 hour(s))   CBC with Auto Differential    Collection Time: 02/15/24 11:10 AM   Result Value Ref Range    WBC 12.2 (H) 4.1 - 11.1 K/uL    RBC 4.69 4.10 - 5.70 M/uL    Hemoglobin 13.7 12.1 - 17.0 g/dL    Hematocrit 42.9 36.6 - 50.3 %    MCV 91.5 80.0 - 99.0 FL    MCH 29.2 26.0 - 34.0 PG    MCHC 31.9 30.0 - 36.5 g/dL    RDW 14.9 (H) 11.5 - 14.5 %    Platelets 262 150 - 400 K/uL    MPV 10.4 8.9 - 12.9 FL    Nucleated RBCs 0.0 0.0  WBC    nRBC 0.00 0.00 - 0.01 K/uL    Neutrophils % 85 (H) 32 - 75 %    Lymphocytes % 6 (L) 12 - 49 %    Monocytes % 8 5 - 13 %    Eosinophils % 0 0 - 7 %    Basophils % 0 0 - 1 %    Immature Granulocytes 1 (H) 0 - 0.5 %    Neutrophils Absolute 10.4 (H) 1.8 - 8.0 K/UL    Lymphocytes Absolute 0.7 (L) 0.8 - 3.5 K/UL    Monocytes Absolute 1.0 0.0 - 1.0 K/UL    Eosinophils Absolute 0.0 0.0 - 0.4 K/UL    Basophils Absolute 0.0 0.0 - 0.1 K/UL    Absolute Immature Granulocyte 0.1 (H) 0.00 - 0.04 K/UL    Differential Type SMEAR SCANNED      RBC Comment NORMOCYTIC, NORMOCHROMIC     CMP    Collection Time: 02/15/24 11:10 AM   Result Value Ref Range    Sodium 139 136 - 145 mmol/L    Potassium 4.9 3.5 -  5.1 mmol/L    Chloride 106 97 - 108 mmol/L    CO2 28 21 - 32 mmol/L    Anion Gap 5 5 - 15 mmol/L    Glucose 103 (H) 65 - 100 mg/dL    BUN 25 (H) 6 - 20 MG/DL    Creatinine 1.21 0.70 - 1.30 MG/DL    Bun/Cre Ratio 21 (H) 12 - 20      Est, Glom Filt Rate 55 (L) >60 ml/min/1.73m2    Calcium 8.9 8.5 - 10.1 MG/DL    Total Bilirubin 0.9 0.2 - 1.0 MG/DL    ALT 22 12 - 78 U/L    AST 21 15 - 37 U/L    Alk Phosphatase 80 45 - 117 U/L    Total Protein 6.1 (L) 6.4 - 8.2 g/dL    Albumin 3.1 (L) 3.5 - 5.0 g/dL    Globulin 3.0 2.0 - 4.0 g/dL    Albumin/Globulin Ratio 1.0 (L) 1.1 - 2.2           Impression:     Patient Active Problem List    Diagnosis Date Noted    Closed fracture of left pelvis, initial encounter ([facility]) 02/15/2024    Wound infection 11/30/2023    Skin ulcer of upper arm with fat layer exposed ([facility]) 11/22/2023    Ulcer of right lower extremity with fat layer exposed ([facility]) 11/08/2023    Mobitz type II atrioventricular block     Atrial flutter, paroxysmal ([facility]) 09/30/2015    Anticoagulated 09/30/2015    Pacemaker 09/30/2015    Paroxysmal ventricular tachycardia ([facility]) 08/28/2012    Sinoatrial node dysfunction ([facility])      Principal Problem:    Closed fracture of left pelvis, initial encounter ([facility])  Resolved Problems:    * No resolved hospital problems. *      Plan:   -  L unferior pubic rami fracture, LLE pain - no fracture fracture per XR with stable TKA hardware, pain is well managed at this time, WBAT with plan for PT/OT. Will follow up in AM to assess progress with PT       Dr. Jaquez aware and agrees with plan as above.        NICOL House - NP  Orthopedic Nurse Practitioner   [facility]

## 2024-02-15 NOTE — ED NOTES
Pt boosted in bed & provided pillow. Wife remains @ bedside. Pt reports pain is the same, however repositioning did help some.

## 2024-02-15 NOTE — ACP (ADVANCE CARE PLANNING)
Advance Care Planning     Advance Care Planning (ACP) Physician/NP/PA Conversation    Date of Conversation: 2/15/2024  Diagnosis: pelvis fracture    Conducted with: Patient with Decision Making Capacity    Healthcare Decision Maker: WifeUna  No healthcare decision makers have been documented.  Click here to complete HealthCare Decision Makers including selection of the Healthcare Decision Maker Relationship (ie \"Primary\")         Care Preferences:    Hospitalization:  \"If your health worsens and it becomes clear that your chance of recovery is unlikely, what would be your preference regarding hospitalization?\"  Do everything for now    Ventilation:  \"If you were unable to breath on your own and your chance of recovery was unlikely, what would be your preference about the use of a ventilator (breathing machine) if it was available to you?\"  DNR    Resuscitation:  \"In the event your heart stopped as a result of an underlying serious health condition, would you want attempts made to restart your heart, or would you prefer a natural death?\"  DNR    Conversation Outcomes / Follow-Up Plan:  Discussed with patient and wife regarding current medical issues, prognosis, and treatments.  Wife is POA.  Confirmed with patient about DNR status.  Patient lives with wife and would like to go home after discharge.  Would be open to rehab.      Length of Voluntary ACP Conversation in minutes:  16 minutes    Zafar Xavier MD

## 2024-02-15 NOTE — CARE COORDINATION
2/15/2024 4:19 PM   Care Management Assessment      Reason for Admission:  Emergency -     Unable to walk [R26.2]  Injury of head, initial encounter [S09.90XA]  Fall, initial encounter [W19.XXXA]  Closed fracture of left inferior pubic ramus, initial encounter ([facility]) [S32.592A]  Closed fracture of left pelvis, initial encounter ([facility]) [S32.9XXA]           Assessment:   [x]In person with pt and pt's wifeUna   Charted address and phone numbers confirmed.       RUR: Readmission Risk              Risk of Unplanned Readmission:  11           Risk Level: [x]Low []Moderate []High  Value-based purchasing: [] Yes [x] No    Advance Directive: DNR     [x] No AD on file. Confirmed DNR status and relayed to pt's Attending   [] AD on file.    [] Current AD not on file. Copy requested.    [] Requests AD, and referral submitted to [facility].     Healthcare Decision Maker:         Assessment:     02/15/24 1611   Service Assessment   Patient Orientation Alert and Oriented   Cognition Alert   History Provided By Patient   Primary Caregiver Self   Accompanied By/Relationship Pt's wife, Uan   Support Systems Spouse/Significant Other;Children   Patient's Healthcare Decision Maker is: Legal Next of Kin   PCP Verified by CM Yes   Last Visit to PCP Within last 3 months   Prior Functional Level Independent in ADLs/IADLs   Can patient return to prior living arrangement Unknown at present   Ability to make needs known: Good   Family able to assist with home care needs: Yes   Would you like for me to discuss the discharge plan with any other family members/significant others, and if so, who? Yes   Financial Resources Medicare  ([facility] Medicare)   Social/Functional History   Lives With Spouse   Type of Home House   Home Layout One level   Home Access   (7 steps to enter)   Bathroom Equipment None   Home Equipment None   ADL Assistance Independent   Ambulation Assistance Independent   Transfer Assistance Independent   Active  Yes

## 2024-02-15 NOTE — ED PROVIDER NOTES
EMERGENCY DEPARTMENT PHYSICIAN NOTE     Patient: David Delarosa     Time of Service: 2/15/2024  9:15 AM     Chief complaint:   Chief Complaint   Patient presents with    Fall    Hip Pain        HISTORY:  Patient is a 89 y.o. male who presents to the emergency department with complaints of left hip pain.       Past Medical History:   Diagnosis Date    DJD (degenerative joint disease) of cervical spine     DJD (degenerative joint disease) of hip     Hyperlipidemia     Ill-defined condition 09/2016    Pneumonia    Mobitz (type) II atrioventricular block     Pacemaker     Paroxysmal A-fib (HCC)     Sinoatrial node dysfunction (HCC)     Vertigo     pt states it's a side effect from Covid 2020        Past Surgical History:   Procedure Laterality Date    GEN INSERT/REPLACE ONLY DUAL  8/3/2016         HERNIA REPAIR Right     Inguinal Hernia    MOHS SURGERY      PACEMAKER  2008 & 2016    Right Upper Chest    ROTATOR CUFF REPAIR Right 1998 & 2003    TOTAL KNEE ARTHROPLASTY Left 07/2016    Partial Knee Replacement    UROLOGICAL SURGERY      TURP-for BPH        Family History   Problem Relation Age of Onset    Heart Disease Father         Social History     Socioeconomic History    Marital status:    Tobacco Use    Smoking status: Never    Smokeless tobacco: Never   Substance and Sexual Activity    Alcohol use: No     Alcohol/week: 0.0 standard drinks of alcohol    Drug use: No     Social Determinants of Health     Food Insecurity: Patient Declined (2/15/2024)    Hunger Vital Sign     Worried About Running Out of Food in the Last Year: Patient declined     Ran Out of Food in the Last Year: Patient declined   Transportation Needs: Patient Declined (2/15/2024)    PRAPARE - Transportation     Lack of Transportation (Medical): Patient declined     Lack of Transportation (Non-Medical): Patient declined   Housing Stability: Patient Declined (2/15/2024)    Housing Stability Vital Sign     Unable to Pay for Housing in the  infusion (has no administration in time range)   ondansetron (ZOFRAN) injection 4 mg (has no administration in time range)   sennosides-docusate sodium (SENOKOT-S) 8.6-50 MG tablet 1 tablet (1 tablet Oral Given 2/15/24 2025)   polyethylene glycol (GLYCOLAX) packet 17 g (has no administration in time range)   acetaminophen (TYLENOL) tablet 650 mg (has no administration in time range)     Or   acetaminophen (TYLENOL) suppository 650 mg (has no administration in time range)   predniSONE (DELTASONE) tablet 5 mg (has no administration in time range)   atorvastatin (LIPITOR) tablet 20 mg (has no administration in time range)   apixaban (ELIQUIS) tablet 5 mg (5 mg Oral Given 2/15/24 2025)   DULoxetine (CYMBALTA) extended release capsule 30 mg (has no administration in time range)   diazePAM (VALIUM) tablet 2 mg (has no administration in time range)   DOPamine (INTROPIN) 800 mg in dextrose 5 % 250 mL infusion (has no administration in time range)   morphine sulfate (PF) injection 4 mg (4 mg IntraMUSCular Given 2/15/24 1012)   fentaNYL (SUBLIMAZE) injection 50 mcg (50 mcg IntraVENous Given 2/15/24 1114)       Differential Diagnosis included but not limited to:  Such as:  Fracture, dislocation, foreign body, arterial injury, nerve injury, infection.    Medical Decision Making  The patient was placed into an examination in room.    Nursing notes were reviewed.    The patient was interviewed and an examination was completed by me with the above findings.        MDM:    This is a 89-year-old male presents to the ED via POV for chief complaint of left hip pain.  Patient says he fell yesterday taking out the trash.  He states he slipped causing him to lose control of the trash can and he fell on his left side.  He had the left head of his head as well.  He is on Eliquis for A-fib.  Patient did lose consciousness.  Patient has been having pain noted to the left hip and pain with ambulation.  He has taken Tylenol yesterday with some

## 2024-02-15 NOTE — H&P
[facility]  [address] [place] [address] [phone]        Hospitalist Admission History and Physical      NAME:  David Delarosa   :   1930   MRN:  [id number]     PCP:  Angel Esteves     Date/Time of service:  2/15/2024  3:04 PM        Subjective:     CHIEF COMPLAINT: \"I fell down\"     HISTORY OF PRESENT ILLNESS:     The patient is a 89 yo hx of Pafib, SSS s/p pacer, vertigo, presented w/ L inferior pubic rami fracture s/p fall.  The patient was taking out the trash when he had a GLF onto his left side.  Denied syncope, chest pain, SOB, fevers, chills, nausea, vomiting, diarrhea.  The patient suffers from severe vertigo and will have periods of ataxia. In the ED, Xray confirmed pelvis fracture.      No Known Allergies    Prior to Admission medications    Medication Sig Start Date End Date Taking? Authorizing Provider   ELIQUIS 5 MG TABS tablet TAKE 1 TABLET BY MOUTH  TWICE DAILY 23   Luma Figueredo, APRN - NP   DULoxetine (CYMBALTA) 30 MG extended release capsule Take 1 capsule by mouth daily 23   Provider, MD Jairo   predniSONE (DELTASONE) 10 MG tablet Take 1 tablet by mouth daily 23   Automatic Reconciliation, Ar   simvastatin (ZOCOR) 40 MG tablet Take 1 tablet by mouth nightly 17   Automatic Reconciliation, Ar       Past Medical History:   Diagnosis Date    DJD (degenerative joint disease) of cervical spine     DJD (degenerative joint disease) of hip     Hyperlipidemia     Ill-defined condition 2016    Pneumonia    Mobitz (type) II atrioventricular block     Pacemaker     Paroxysmal A-fib (HCC)     Sinoatrial node dysfunction (HCC)     Vertigo     pt states it's a side effect from Covid         Past Surgical History:   Procedure Laterality Date    GEN INSERT/REPLACE ONLY DUAL  8/3/2016         HERNIA REPAIR Right     Inguinal Hernia    MOHS SURGERY      PACEMAKER   &     Right Upper Chest    ROTATOR CUFF REPAIR  results for input(s): \"INR\" in the last 72 hours.    Radiology and EKG reviewed:   hip xray reviewed    **Prior records, notes, labs, radiology, and medications reviewed in [facility]**       Assessment/Plan:       Principal Problem:     89 yo hx of Pafib, SSS s/p pacer, vertigo, presented w/ L inferior pubic rami fracture s/p fall    1) Closed fracture of left pelvis: due to mechanical fall.  No syncope.  Likely non-surgical.  Will obtain pelvis CT to r/o hip fracture.  Use IV dilaudid prn severe pain.  Consult Ortho, PT/OT    2) L elbow wound: due to fall. Will obtain Xray to r/o fracture.  Consult wound care    3) Pafib: in NSR.  Has pacer.  Cont Eliquis    4) Arthritis: unclear if patient has RA.  Cont home prednisone    5) Vertigo: chronic, persistent.  Head CT unremarkable.  Start Valium prn     Risk of deterioration: high      Total time spent with patient care: 70 Minutes **I personally saw and examined the patient during this time period**                 Care Plan discussed with: Patient, nursing, wife     Discussed:  Care Plan    Prophylaxis:   eliquis     Probable Disposition:  [facility]/Rehab           ___________________________________________________    Attending Physician: Zafar Xavier MD

## 2024-02-15 NOTE — ED NOTES
Pt was placed in gown on arrival. Pt sent to 4th floor room 425. Wife & other family members aware.    Pt sent w/ clothing & shoes. Wallet given to wife.    Verbal shift change report given to Maria Luisa receiving nurse (oncoming nurse) by Meka ARIZMENDI (offgoing nurse). Report included the following information Nurse Handoff Report, Index, ED Encounter Summary, ED SBAR, Adult Overview, Intake/Output, and Recent Results.        Pt tx  via H.

## 2024-02-16 LAB
ALBUMIN SERPL-MCNC: 2.9 G/DL (ref 3.5–5)
ALBUMIN/GLOB SERPL: 1.2 (ref 1.1–2.2)
ALP SERPL-CCNC: 68 U/L (ref 45–117)
ALT SERPL-CCNC: 19 U/L (ref 12–78)
ANION GAP SERPL CALC-SCNC: 2 MMOL/L (ref 5–15)
AST SERPL-CCNC: 20 U/L (ref 15–37)
BASOPHILS # BLD: 0 K/UL (ref 0–0.1)
BASOPHILS NFR BLD: 0 % (ref 0–1)
BILIRUB SERPL-MCNC: 1.2 MG/DL (ref 0.2–1)
BUN SERPL-MCNC: 22 MG/DL (ref 6–20)
BUN/CREAT SERPL: 21 (ref 12–20)
CALCIUM SERPL-MCNC: 8.9 MG/DL (ref 8.5–10.1)
CHLORIDE SERPL-SCNC: 109 MMOL/L (ref 97–108)
CO2 SERPL-SCNC: 29 MMOL/L (ref 21–32)
CREAT SERPL-MCNC: 1.03 MG/DL (ref 0.7–1.3)
CRP SERPL-MCNC: 1.95 MG/DL (ref 0–0.3)
DIFFERENTIAL METHOD BLD: ABNORMAL
EKG ATRIAL RATE: 62 BPM
EKG DIAGNOSIS: NORMAL
EKG DIAGNOSIS: NORMAL
EKG P-R INTERVAL: 194 MS
EKG Q-T INTERVAL: 390 MS
EKG Q-T INTERVAL: 460 MS
EKG QRS DURATION: 134 MS
EKG QRS DURATION: 166 MS
EKG QTC CALCULATION (BAZETT): 444 MS
EKG QTC CALCULATION (BAZETT): 466 MS
EKG R AXIS: -41 DEGREES
EKG R AXIS: 86 DEGREES
EKG T AXIS: 114 DEGREES
EKG T AXIS: 259 DEGREES
EKG VENTRICULAR RATE: 62 BPM
EKG VENTRICULAR RATE: 78 BPM
EOSINOPHIL # BLD: 0.1 K/UL (ref 0–0.4)
EOSINOPHIL NFR BLD: 1 % (ref 0–7)
ERYTHROCYTE [DISTWIDTH] IN BLOOD BY AUTOMATED COUNT: 14.8 % (ref 11.5–14.5)
EST. AVERAGE GLUCOSE BLD GHB EST-MCNC: 131 MG/DL
GLOBULIN SER CALC-MCNC: 2.5 G/DL (ref 2–4)
GLUCOSE SERPL-MCNC: 93 MG/DL (ref 65–100)
HBA1C MFR BLD: 6.2 % (ref 4–5.6)
HCT VFR BLD AUTO: 42 % (ref 36.6–50.3)
HGB BLD-MCNC: 13.1 G/DL (ref 12.1–17)
IMM GRANULOCYTES # BLD AUTO: 0 K/UL (ref 0–0.04)
IMM GRANULOCYTES NFR BLD AUTO: 0 % (ref 0–0.5)
LYMPHOCYTES # BLD: 1.4 K/UL (ref 0.8–3.5)
LYMPHOCYTES NFR BLD: 16 % (ref 12–49)
MAGNESIUM SERPL-MCNC: 2 MG/DL (ref 1.6–2.4)
MCH RBC QN AUTO: 28.4 PG (ref 26–34)
MCHC RBC AUTO-ENTMCNC: 31.2 G/DL (ref 30–36.5)
MCV RBC AUTO: 91.1 FL (ref 80–99)
MONOCYTES # BLD: 0.9 K/UL (ref 0–1)
MONOCYTES NFR BLD: 11 % (ref 5–13)
NEUTS SEG # BLD: 6.4 K/UL (ref 1.8–8)
NEUTS SEG NFR BLD: 72 % (ref 32–75)
NRBC # BLD: 0 K/UL (ref 0–0.01)
NRBC BLD-RTO: 0 PER 100 WBC
PHOSPHATE SERPL-MCNC: 3 MG/DL (ref 2.6–4.7)
PLATELET # BLD AUTO: 241 K/UL (ref 150–400)
PMV BLD AUTO: 10.9 FL (ref 8.9–12.9)
POTASSIUM SERPL-SCNC: 4.2 MMOL/L (ref 3.5–5.1)
PROT SERPL-MCNC: 5.4 G/DL (ref 6.4–8.2)
RBC # BLD AUTO: 4.61 M/UL (ref 4.1–5.7)
SODIUM SERPL-SCNC: 140 MMOL/L (ref 136–145)
TSH SERPL DL<=0.05 MIU/L-ACNC: 2.55 UIU/ML (ref 0.36–3.74)
WBC # BLD AUTO: 9 K/UL (ref 4.1–11.1)

## 2024-02-16 PROCEDURE — 83036 HEMOGLOBIN GLYCOSYLATED A1C: CPT

## 2024-02-16 PROCEDURE — 86140 C-REACTIVE PROTEIN: CPT

## 2024-02-16 PROCEDURE — 2700000000 HC OXYGEN THERAPY PER DAY

## 2024-02-16 PROCEDURE — 93010 ELECTROCARDIOGRAM REPORT: CPT | Performed by: SPECIALIST

## 2024-02-16 PROCEDURE — 6370000000 HC RX 637 (ALT 250 FOR IP): Performed by: INTERNAL MEDICINE

## 2024-02-16 PROCEDURE — 97530 THERAPEUTIC ACTIVITIES: CPT

## 2024-02-16 PROCEDURE — 83735 ASSAY OF MAGNESIUM: CPT

## 2024-02-16 PROCEDURE — 84100 ASSAY OF PHOSPHORUS: CPT

## 2024-02-16 PROCEDURE — 2060000000 HC ICU INTERMEDIATE R&B

## 2024-02-16 PROCEDURE — 97535 SELF CARE MNGMENT TRAINING: CPT

## 2024-02-16 PROCEDURE — 84443 ASSAY THYROID STIM HORMONE: CPT

## 2024-02-16 PROCEDURE — 94761 N-INVAS EAR/PLS OXIMETRY MLT: CPT

## 2024-02-16 PROCEDURE — 99223 1ST HOSP IP/OBS HIGH 75: CPT | Performed by: INTERNAL MEDICINE

## 2024-02-16 PROCEDURE — 85025 COMPLETE CBC W/AUTO DIFF WBC: CPT

## 2024-02-16 PROCEDURE — 97116 GAIT TRAINING THERAPY: CPT

## 2024-02-16 PROCEDURE — 93005 ELECTROCARDIOGRAM TRACING: CPT

## 2024-02-16 PROCEDURE — 80053 COMPREHEN METABOLIC PANEL: CPT

## 2024-02-16 PROCEDURE — 97165 OT EVAL LOW COMPLEX 30 MIN: CPT

## 2024-02-16 PROCEDURE — 2580000003 HC RX 258: Performed by: INTERNAL MEDICINE

## 2024-02-16 PROCEDURE — 36415 COLL VENOUS BLD VENIPUNCTURE: CPT

## 2024-02-16 PROCEDURE — 97161 PT EVAL LOW COMPLEX 20 MIN: CPT

## 2024-02-16 RX ORDER — DOPAMINE HYDROCHLORIDE 320 MG/100ML
1-20 INJECTION, SOLUTION INTRAVENOUS CONTINUOUS PRN
Status: DISCONTINUED | OUTPATIENT
Start: 2024-02-16 | End: 2024-02-16

## 2024-02-16 RX ADMIN — SODIUM CHLORIDE, PRESERVATIVE FREE 10 ML: 5 INJECTION INTRAVENOUS at 09:13

## 2024-02-16 RX ADMIN — DOCUSATE SODIUM 50MG AND SENNOSIDES 8.6MG 1 TABLET: 8.6; 5 TABLET, FILM COATED ORAL at 20:22

## 2024-02-16 RX ADMIN — DULOXETINE HYDROCHLORIDE 30 MG: 30 CAPSULE, DELAYED RELEASE ORAL at 09:11

## 2024-02-16 RX ADMIN — DIAZEPAM 2 MG: 2 TABLET ORAL at 20:22

## 2024-02-16 RX ADMIN — PREDNISONE 5 MG: 5 TABLET ORAL at 09:11

## 2024-02-16 RX ADMIN — APIXABAN 5 MG: 5 TABLET, FILM COATED ORAL at 20:22

## 2024-02-16 RX ADMIN — APIXABAN 5 MG: 5 TABLET, FILM COATED ORAL at 09:11

## 2024-02-16 RX ADMIN — ATORVASTATIN CALCIUM 20 MG: 20 TABLET, FILM COATED ORAL at 09:11

## 2024-02-16 RX ADMIN — OXYCODONE 5 MG: 5 TABLET ORAL at 20:28

## 2024-02-16 ASSESSMENT — PAIN DESCRIPTION - LOCATION: LOCATION: HIP;KNEE

## 2024-02-16 ASSESSMENT — PAIN SCALES - GENERAL
PAINLEVEL_OUTOF10: 2
PAINLEVEL_OUTOF10: 5

## 2024-02-16 ASSESSMENT — PAIN DESCRIPTION - ORIENTATION: ORIENTATION: LEFT

## 2024-02-16 ASSESSMENT — PAIN DESCRIPTION - DESCRIPTORS: DESCRIPTORS: ACHING;SORE

## 2024-02-16 ASSESSMENT — PAIN - FUNCTIONAL ASSESSMENT: PAIN_FUNCTIONAL_ASSESSMENT: ACTIVITIES ARE NOT PREVENTED

## 2024-02-16 NOTE — WOUND CARE
Wound Consult:  new consult Visit. Chart reviewed.  Consulted for let arm skin tear.  Spoke with patients nurse,  marcelle ARIZMENDI.  Patient is sitting in chair    Patient is awake, alert, cooperative, oriented X4  Jey score 18.  Assessment:  Left forearm/posterior- 4x2x0.1cm skin tear, moist, 80% flap intact, pink, red, slight bloody drainage, surrounding ecchymosis, fragile skin  Left elbow- 1x0.2x0.1cm skin tear 90% flap intact,no surrounding redness or drainage  Treatment:  Left arm- cleansed with wound cleanser, mepitle 1 and foam dressing , hydrocolloid to elbow  Wound Recommendations:  Left arm- cleansewith wound cleanser, mepitle 1 and foam dressing  and  hydrocolloid to elbow, change every 3 days  Plan:  Spoke with Dr. Xavier regarding findings and proposed orders for treatment.  We will continue to reassess weekly and as needed.  Flores Stratton RN  [facility], Wound / Ostomy Department  Wound Healing Office [phone]

## 2024-02-16 NOTE — PLAN OF CARE
Intact          Functional Mobility and Transfers for ADLs:    Bed Mobility:     Bed Mobility Training  Bed Mobility Training:  (up in chair upon arrival)    Transfers:      Transfer Training  Transfer Training: Yes  Sit to Stand: Minimum assistance;Assist X1  Stand to Sit: Minimum assistance;Assist X1  Bed to Chair: Minimum assistance;Assist X1                     Balance:   Standing: Impaired  Balance  Sitting: Intact  Standing: Impaired  Standing - Static: Constant support;Good  Standing - Dynamic: Fair;Constant support      ADL Assessment:          Feeding: Independent       Grooming: Setup       UE Bathing: Setup;Stand by assistance       Skin Care: Chlorhexidine solution;Lotion    LE Bathing: Minimal assistance       UE Dressing: Setup       LE Dressing: Minimal assistance;Increased time to complete;Adaptive equipment  LE Dressing Skilled Clinical Factors: uses AE at baseline    Toileting: Minimal assistance                         ADL Intervention and task modifications:    Patient instructed and indicated understanding the benefits of maintaining activity tolerance, functional mobility, and independence with self care tasks during acute stay  to ensure safe return home and to baseline. Encouraged patient to increase frequency and duration OOB, be out of bed for all meals, perform daily ADLs (as approved by RN/MD regarding bathing etc), and performing functional mobility to/from bathroom.  Pt educated on safe transfer techniques, with specific emphasis on proper hand placement to push up from seated surface rather than attempt to pull self up, fully positioning self in-front of desired seated location, feeling chair on back of legs and reaching back with 1-2 UE to slowly lower self to seated position.            [facility] AM-PACTM \"6 Clicks\"                                                       Daily Activity Inpatient Short Form  How much help from another person does the patient currently need...  referral.  Daysi Francis OT  Minutes: 30    Occupational Therapy Evaluation Charge Determination   History Examination Decision-Making   LOW Complexity : Brief history review  LOW Complexity: 1-3 Performance deficits relating to physical, cognitive, or psychosocial skills that result in activity limitations and/or participation restrictions LOW Complexity: No comorbidities that affect functional and  no verbal  or physical assist needed to complete eval tasks   Based on the above components, the patient evaluation is determined to be of the following complexity level: Low

## 2024-02-16 NOTE — FLOWSHEET NOTE
Called to bedside due to bradycardia. Patient is bradycardic to the 30s. Patient denies palpitations, CP, dizziness, SOB, diff breathing. Patient confirms dual chamber pacemaker placement for SSS. Patient w/ recent fall - syncope? EKG w/ dual paced rhythm. Bradycardia confirmed manually. V - 94% RA, 38, 16, 148/69 Will check electrolytes and replete as needed. Dopamine gtt PRN - HR less than 60 BPM. Will transfer to ICU w/ dopamine initiation. Consult for cards in AM for pacemaker interrogation. Nursing to monitor for hypotension and/ or sx development. Upgrade to telemetry for continued monitoring.

## 2024-02-16 NOTE — CONSULTS
Moderately increased wall thickness. Moderate septal thickening. Mild posterior thickening. Findings consistent with moderate asymmetric hypertrophy. Normal wall motion.   ·  Right Ventricle: Right ventricle is mildly dilated. Lead present in the right ventricle.   ·  Aortic Valve: Trileaflet valve. Mild sclerosis of the aortic valve cusp. Mild regurgitation.   ·  Mitral Valve: Valve structure is normal. Mild annular calcification of the mitral valve. Mild regurgitation.   ·  Tricuspid Valve: Moderate regurgitation. Moderately elevated RVSP. The estimated RVSP is 55 mmHg.   ·  Left Atrium: Left atrium is moderately dilated. Left atrial volume index is mildly increased (35-41 mL/m2). LA Vol Index A/L is 37 mL/m2.   ·  Right Atrium: Right atrium is mildly dilated.   ·  Aorta: Normal sized aortic root. Mildly dilated ascending aorta. Ao ascending diameter is 3.8 cm.     LVEF normal with atrial fibrillation and pacemaker     Signed by: Pedro Orlando MD on 5/18/2023  7:58 AM     ECG (02/15/2024): AV pacing with ventricular bigeminy.       Review of Systems     [x]All other systems reviewed and all negative except as written in HPI     [ ] Patient unable to provide secondary to condition               Patient Active Problem List   Diagnosis   · Atrial flutter, paroxysmal (HCC)   · Mobitz type II atrioventricular block   · Paroxysmal ventricular tachycardia (HCC)   · Sinoatrial node dysfunction (HCC)   · Anticoagulated   · Pacemaker   · Ulcer of right lower extremity with fat layer exposed (HCC)   · Skin ulcer of upper arm with fat layer exposed (HCC)   · Wound infection   · Closed fracture of left pelvis, initial encounter ([facility])       Past Medical History:   Diagnosis Date   · DJD (degenerative joint disease) of cervical spine   · DJD (degenerative joint disease) of hip   · Hyperlipidemia   · Ill-defined condition 09/2016   Pneumonia   · Mobitz (type) II atrioventricular block   · Pacemaker   · Paroxysmal A-fib (HCC)   ·  (LIPITOR) tablet 20 mg 20 mg Oral Daily Zafar Xavier MD   · apixaban (ELIQUIS) tablet 5 mg 5 mg Oral BID Zafar Xavier MD 5 mg at 02/15/24 2025   · DULoxetine (CYMBALTA) extended release capsule 30 mg 30 mg Oral Daily Zafar Xavier MD   · diazePAM (VALIUM) tablet 2 mg 2 mg Oral Q6H PRN Zafar Xavier MD Matthew M. Ngo, M.D.   Electrophysiology/Cardiology   [facility] [place]   [address]                      [address] [address]   [place] [address]                             [facility] [address] [phone]      CC:Angel Esteves

## 2024-02-16 NOTE — PROGRESS NOTES
[facility]  [address] [place] [address] [phone]        Hospitalist Progress Note      NAME: David Delarosa   :  1930  MRM:  [id number]    Date/Time of service: 2024  12:35 PM       Subjective:     Chief Complaint:  Patient was personally seen and examined by me during this time period.  Chart reviewed.  Nursing reported bradycardia in the 30s overnight.  Patient is asymptomatic        Objective:       Vitals:       Last 24hrs VS reviewed since prior progress note. Most recent are:    Vitals:    24 1122   BP: (!) 126/54   Pulse: 59   Resp: 15   Temp: 98.1 °F (36.7 °C)   SpO2: 94%     SpO2 Readings from Last 6 Encounters:   24 94%   23 99%   23 98%   23 97%   23 99%   23 97%          Intake/Output Summary (Last 24 hours) at 2024 1235  Last data filed at 2024 0741  Gross per 24 hour   Intake 240 ml   Output 300 ml   Net -60 ml        Exam:     Physical Exam:    Gen:  elderly, frail, NAD  HEENT:  Pink conjunctivae, PERRL, hard of hearing, moist mucous membranes  Neck:  Supple, without masses, thyroid non-tender  Resp:  No accessory muscle use, clear breath sounds without wheezes rales or rhonchi  Card:  No murmurs, normal S1, S2 without thrills, bruits or peripheral edema  Abd:  Soft, non-tender, non-distended, normoactive bowel sounds are present  Lymph:  No cervical adenopathy  Musc:  No cyanosis or clubbing, L pelvis pain to palpation  Skin:  L elbow abrasion, wound  Neuro:  Cranial nerves 3-12 are grossly intact, follows commands appropriately  Psych:  Alert with good insight.  Oriented to person, place, and time    Medications Reviewed: (see below)    Lab Data Reviewed: (see below)    ______________________________________________________________________    Medications:     Current Facility-Administered Medications   Medication Dose Route Frequency    HYDROmorphone (DILAUDID) injection 0.5 mg  0.5 mg  Watchman device due to recurrent dizziness and syncope, bu patient is not agreeable.  Cont Eliquis    3) L elbow wound: due to fall. Xray neg for fractures.  wound care team following     4) Arthritis: unclear if patient has RA.  Cont home prednisone     5) Vertigo: chronic, persistent.  Head CT unremarkable.  Cont Valium prn    **Prior records, notes, labs, radiology, and medications reviewed in [facility]**    Total time spent with patient care: 35 min  **I personally saw and examined the patient during this time period**                 Care Plan discussed with: Patient, nursing     Discussed:  Care Plan    Prophylaxis:   eliquis     Disposition:   PT, OT, RN           ___________________________________________________    Attending Physician: Zafar Xavier MD

## 2024-02-16 NOTE — PROGRESS NOTES
Pt HR noted in 30's. Denies CP/SOB/Dizziness. Pt alert, oriented, skin warm and dry. EKG done with dual pacer reading. Labs drawn and pt upgraded. Provider present.

## 2024-02-16 NOTE — PROGRESS NOTES
Orthopedic NP Progress Note  Post Op Day: * No surgery found *    February 16, 2024 12:47 PM     David Delarosa    Attending Physician: Treatment Team: Attending Provider: Zafar Xavier MD; Consulting Physician: Tano Jaquez MD; : Ирина Elizondo; Consulting Physician: Natividad De Paz APRN - NP; Consulting Provider: Natividad De Paz APRN - NP; Registered Nurse: Anne Guthrie RN; Occupational Therapist: Daysi Francis OT; Physical Therapist: Arianna Al PT     Vital Signs:    Patient Vitals for the past 8 hrs:   BP Temp Temp src Pulse Resp SpO2   02/16/24 1122 (!) 126/54 98.1 °F (36.7 °C) Oral 59 15 94 %   02/16/24 0736 (!) 150/69 97.3 °F (36.3 °C) Oral 59 20 93 %   02/16/24 0700 -- -- -- 60 -- --          Intake/Output:  02/16 0701 - 02/16 1900  In: 240 [P.O.:240]  Out: -   02/14 1901 - 02/16 0700  In: -   Out: 300 [Urine:300]    Pain Control:        LAB:    Recent Labs     02/16/24  0529   HCT 42.0   HGB 13.1     Lab Results   Component Value Date/Time     02/16/2024 05:29 AM    K 4.2 02/16/2024 05:29 AM     02/16/2024 05:29 AM    CO2 29 02/16/2024 05:29 AM    BUN 22 02/16/2024 05:29 AM       Subjective:  David Delarosa is a 89 y.o. male with improved LLE pain, pt up in chair and states his leg is a little sore with walking and was able to walker to the [facility] the RN and awaiting PT    . Tolerating diet.       Objective: General: alert, cooperative, no distress.    Neuro/Vascular: CNS Intact.  Sensation stable. Brisk cap refill, 2+ pulses UE/LE  Musculoskeletal:    + FROM of bilat UE/LE, No pain to LLE with movement, NVI  Skin: warm and dry, dressing to L elbow              PT/OT:   Gait:                      Assessment:    s/p     Principal Problem:    Closed fracture of left pelvis, initial encounter ([facility])  Resolved Problems:    * No resolved hospital problems. *       Plan:   -  L inferior pubic rami fracture, LLE pain - CT reviewed, pain improving with current pain management

## 2024-02-16 NOTE — TRANSITION OF CARE
Verbal report given to UGO Palm om being transffered to , for transition of care.   Report consistent of patient SBAR

## 2024-02-16 NOTE — PLAN OF CARE
Problem: Physical Therapy - Adult  Goal: By Discharge: Performs mobility at highest level of function for planned discharge setting.  See evaluation for individualized goals.  Description: FUNCTIONAL STATUS PRIOR TO ADMISSION: Patient was independent and active without use of DME.    HOME SUPPORT PRIOR TO ADMISSION: The patient lived with his wife but did not require assistance.  Wife recently began using RW due to mobility concerns.    Physical Therapy Goals  Initiated 2/16/2024  1.  Patient will move from supine to sit and sit to supine, scoot up and down, and roll side to side in bed with modified independence within 7 day(s).    2.  Patient will perform sit to stand with modified independence within 7 day(s).  3.  Patient will transfer from bed to chair and chair to bed with modified independence using the least restrictive device within 7 day(s).  4.  Patient will ambulate with modified independence for 120 feet with the least restrictive device within 7 day(s).   5.  Patient will ascend/descend 7 stairs with 2 handrail(s) with supervision/set-up within 7 day(s).    Outcome: Progressing   PHYSICAL THERAPY EVALUATION    Patient: David Delarosa (89 y.o. male)  Date: 2/16/2024  Primary Diagnosis: Unable to walk [R26.2]  Injury of head, initial encounter [S09.90XA]  Fall, initial encounter [W19.XXXA]  Closed fracture of left inferior pubic ramus, initial encounter ([facility]) [S32.592A]  Closed fracture of left pelvis, initial encounter ([facility]) [S32.9XXA]       Precautions: Restrictions/Precautions: Weight Bearing   Lower Extremity Weight Bearing Restrictions  Left Lower Extremity Weight Bearing: Weight Bearing As Tolerated                  ASSESSMENT :   DEFICITS/IMPAIRMENTS:   The patient is limited by decreased functional mobility, ROM, strength, endurance, safety awareness, balance, increased pain levels, and gait dysfunction s/p admission for fall with L inferior pubic ramus fracture. Patient up in chair upon    MEDIUM  Complexity : 1-2 comorbidities / personal factors will impact the outcome/ POC  MEDIUM Complexity : 3 Standardized tests and measures addressin body structure, function, activity limitation and / or participation in recreation  MEDIUM Complexity : Evolving with changing characteristics  AM-PAC  MEDIUM   Based on the above components, the patient evaluation is determined to be of the following complexity level: Medium

## 2024-02-17 VITALS
OXYGEN SATURATION: 95 % | DIASTOLIC BLOOD PRESSURE: 53 MMHG | BODY MASS INDEX: 26.27 KG/M2 | SYSTOLIC BLOOD PRESSURE: 118 MMHG | HEART RATE: 63 BPM | HEIGHT: 70 IN | TEMPERATURE: 98.1 F | RESPIRATION RATE: 16 BRPM | WEIGHT: 183.48 LBS

## 2024-02-17 LAB
ANION GAP SERPL CALC-SCNC: 4 MMOL/L (ref 5–15)
BUN SERPL-MCNC: 21 MG/DL (ref 6–20)
BUN/CREAT SERPL: 21 (ref 12–20)
CALCIUM SERPL-MCNC: 9.2 MG/DL (ref 8.5–10.1)
CHLORIDE SERPL-SCNC: 108 MMOL/L (ref 97–108)
CO2 SERPL-SCNC: 28 MMOL/L (ref 21–32)
CREAT SERPL-MCNC: 0.99 MG/DL (ref 0.7–1.3)
GLUCOSE SERPL-MCNC: 89 MG/DL (ref 65–100)
MAGNESIUM SERPL-MCNC: 2.1 MG/DL (ref 1.6–2.4)
PHOSPHATE SERPL-MCNC: 3 MG/DL (ref 2.6–4.7)
POTASSIUM SERPL-SCNC: 4.2 MMOL/L (ref 3.5–5.1)
SODIUM SERPL-SCNC: 140 MMOL/L (ref 136–145)

## 2024-02-17 PROCEDURE — 80048 BASIC METABOLIC PNL TOTAL CA: CPT

## 2024-02-17 PROCEDURE — 84100 ASSAY OF PHOSPHORUS: CPT

## 2024-02-17 PROCEDURE — 36415 COLL VENOUS BLD VENIPUNCTURE: CPT

## 2024-02-17 PROCEDURE — 83735 ASSAY OF MAGNESIUM: CPT

## 2024-02-17 PROCEDURE — 6370000000 HC RX 637 (ALT 250 FOR IP): Performed by: INTERNAL MEDICINE

## 2024-02-17 PROCEDURE — 2580000003 HC RX 258: Performed by: INTERNAL MEDICINE

## 2024-02-17 RX ORDER — PREDNISONE 5 MG/1
5 TABLET ORAL DAILY
Qty: 10 TABLET | Refills: 0 | Status: SHIPPED | OUTPATIENT
Start: 2024-02-18 | End: 2024-02-28

## 2024-02-17 RX ORDER — OXYCODONE HYDROCHLORIDE 5 MG/1
5 TABLET ORAL EVERY 4 HOURS PRN
Qty: 15 TABLET | Refills: 0 | Status: SHIPPED | OUTPATIENT
Start: 2024-02-17 | End: 2024-02-20

## 2024-02-17 RX ADMIN — DOCUSATE SODIUM 50MG AND SENNOSIDES 8.6MG 1 TABLET: 8.6; 5 TABLET, FILM COATED ORAL at 08:30

## 2024-02-17 RX ADMIN — SODIUM CHLORIDE, PRESERVATIVE FREE 10 ML: 5 INJECTION INTRAVENOUS at 08:31

## 2024-02-17 RX ADMIN — ACETAMINOPHEN 650 MG: 325 TABLET ORAL at 08:30

## 2024-02-17 RX ADMIN — APIXABAN 5 MG: 5 TABLET, FILM COATED ORAL at 08:30

## 2024-02-17 RX ADMIN — DULOXETINE HYDROCHLORIDE 30 MG: 30 CAPSULE, DELAYED RELEASE ORAL at 08:30

## 2024-02-17 RX ADMIN — ATORVASTATIN CALCIUM 20 MG: 20 TABLET, FILM COATED ORAL at 08:30

## 2024-02-17 RX ADMIN — PREDNISONE 5 MG: 5 TABLET ORAL at 08:30

## 2024-02-17 ASSESSMENT — PAIN SCALES - GENERAL: PAINLEVEL_OUTOF10: 6

## 2024-02-17 ASSESSMENT — PAIN DESCRIPTION - LOCATION: LOCATION: HIP;KNEE

## 2024-02-17 NOTE — DISCHARGE INSTRUCTIONS
HOSPITALIST DISCHARGE INSTRUCTIONS  NAME:  David Delarosa   :  1930   MRN:  [id number]     Date/Time:  2024 12:47 PM    ADMIT DATE: 2/15/2024     DISCHARGE DATE: 2024     DISCHARGE DIAGNOSIS:  Fall, pelvic fracture    DISCHARGE INSTRUCTIONS:  Thank you for allowing us to participate in your care. Your discharging Hospitalist is Kassie Rockwell MD. You were admitted for evaluation and treatment of the above. You were evaluated by a cardiologist and our physical therapy team. You are safe to go home as long as you have family stay with you for a week. You will need physical therapy at home. Please take your meds as prescribed an follow up with your PCP as well as with cardiology. Follow up with ortho if your symptoms fail to improve.      MEDICATIONS:    It is important that you take the medication exactly as they are prescribed.   Keep your medication in the bottles provided by the pharmacist and keep a list of the medication names, dosages, and times to be taken in your wallet.   Do not take other medications without consulting your doctor.             If you experience any of the following symptoms then please call your primary care physician or return to the emergency room if you cannot get hold of your doctor:  Fever, chills, nausea, vomiting, diarrhea, change in mentation, falling, bleeding, shortness of breath    Follow Up:  Please call the below provider to arrange hospital follow up appointment      Angel Esteves  [address] [facility] [address] [phone]    Schedule an appointment as soon as possible for a visit      Ashtyn Hogue  [facility] [address]   [address]  [facility] [address] [phone]  Schedule an appointment as soon as possible for a visit      Pedro Orlando MD  [address] [address] [phone]    Schedule an appointment as soon as possible for a visit          Information obtained by :  I understand that if any problems occur once  I am at home I am to contact my physician.    I understand and acknowledge receipt of the instructions indicated above.                                                                                                                                           Physician's or R.N.'s Signature                                                                  Date/Time                                                                                                                                              Patient or Representative Signature                                                          Date/Time

## 2024-02-17 NOTE — DISCHARGE SUMMARY
Hospitalist Discharge Summary     Patient ID:  David Delarosa  [id number]  89 y.o.  1/9/1930    Admit date: 2/15/2024    Discharge date and time: 2/17/2024    Admission Diagnoses: Unable to walk [R26.2]  Injury of head, initial encounter [S09.90XA]  Fall, initial encounter [W19.XXXA]  Closed fracture of left inferior pubic ramus, initial encounter ([facility]) [S32.592A]  Closed fracture of left pelvis, initial encounter ([facility]) [S32.9XXA]    Discharge Diagnoses:    Principal Problem:    Closed fracture of left pelvis, initial encounter ([facility])  Resolved Problems:    * No resolved hospital problems. 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  Denied syncope. Pelvis CT neg for hip fractures. Cleared for discharge home with ortho follow up.     Pafib/flutter/AV block/bradycardia: does not have true bradycardia per EP.  It was related to ventricular bigeminy.  Pacer functioning properly.  EP also recommended Watchman device due to recurrent dizziness and syncope, bu patient is not agreeable yet.  Cont Eliquis     L elbow wound: due to fall. Xray neg for fractures. Keep wound clean and dry. Minor. Evaluated by wound care     Arthritis: unclear if patient has RA.  Cont home prednisone     Vertigo: chronic, persistent.  Head CT unremarkable.  Cont Valium prn    Imaging  CT PELVIS WO CONTRAST Additional Contrast? Radiologist Recommendation    Result Date: 2/15/2024  Nondisplaced left inferior pubic ramus fracture and superior ischiopubic junction fracture.    XR ELBOW LEFT (MIN 3 VIEWS)    Result Date: 2/15/2024  No acute abnormality.    XR HIP 2-3 VW W PELVIS LEFT    Result Date: 2/15/2024  1.  Diffuse osteopenia limits evaluation. 2.  Age-indeterminate left inferior pubic ramus deformity. 3.  Left revised total knee arthroplasty, without definite fracture.     XR KNEE

## 2024-02-17 NOTE — CARE COORDINATION
CM Note:  Pt to d/c home today transported by family.  Order for home health PT/OT/nursing and for a RW.  Referrals sent in [facility] and pt was accepted by [facility].  AVS was sent.  [employer] was not able to cover the RW as pt had rec'd a w/c less than 5 years ago.  UGO Melton

## 2024-05-08 ENCOUNTER — PROCEDURE VISIT (OUTPATIENT)
Age: 89
End: 2024-05-08

## 2024-05-08 ENCOUNTER — OFFICE VISIT (OUTPATIENT)
Age: 89
End: 2024-05-08
Payer: MEDICARE

## 2024-05-08 VITALS
OXYGEN SATURATION: 97 % | HEART RATE: 60 BPM | RESPIRATION RATE: 16 BRPM | BODY MASS INDEX: 22.19 KG/M2 | HEIGHT: 70 IN | WEIGHT: 155 LBS | SYSTOLIC BLOOD PRESSURE: 110 MMHG | DIASTOLIC BLOOD PRESSURE: 48 MMHG

## 2024-05-08 DIAGNOSIS — I48.21 PERMANENT ATRIAL FIBRILLATION (HCC): ICD-10-CM

## 2024-05-08 DIAGNOSIS — I44.2 COMPLETE AV BLOCK (HCC): ICD-10-CM

## 2024-05-08 DIAGNOSIS — R42 DIZZINESS AND GIDDINESS: ICD-10-CM

## 2024-05-08 DIAGNOSIS — Z95.0 PRESENCE OF CARDIAC PACEMAKER: ICD-10-CM

## 2024-05-08 DIAGNOSIS — R42 VERTIGO: ICD-10-CM

## 2024-05-08 DIAGNOSIS — R29.6 FALLS FREQUENTLY: ICD-10-CM

## 2024-05-08 DIAGNOSIS — I48.21 PERMANENT ATRIAL FIBRILLATION (HCC): Primary | ICD-10-CM

## 2024-05-08 DIAGNOSIS — Z95.0 PRESENCE OF CARDIAC PACEMAKER: Primary | ICD-10-CM

## 2024-05-08 PROCEDURE — 99214 OFFICE O/P EST MOD 30 MIN: CPT | Performed by: INTERNAL MEDICINE

## 2024-05-08 PROCEDURE — G8428 CUR MEDS NOT DOCUMENT: HCPCS | Performed by: INTERNAL MEDICINE

## 2024-05-08 PROCEDURE — 1036F TOBACCO NON-USER: CPT | Performed by: INTERNAL MEDICINE

## 2024-05-08 PROCEDURE — 1123F ACP DISCUSS/DSCN MKR DOCD: CPT | Performed by: INTERNAL MEDICINE

## 2024-05-08 PROCEDURE — G8420 CALC BMI NORM PARAMETERS: HCPCS | Performed by: INTERNAL MEDICINE

## 2024-05-08 NOTE — PROGRESS NOTES
Room #: EP 5    Chief Complaint   Patient presents with    Irregular Heart Beat     AFIB/AFLUTTER    Device Check     SJM PPM     Has had 5 falls since February, he has vertigo and it causes him to fall    Weight loss     BP (!) 110/48 (Site: Right Upper Arm, Position: Sitting, Cuff Size: Medium Adult)   Pulse 60   Resp 16   Ht 1.778 m (5' 10\")   Wt 70.3 kg (155 lb)   SpO2 97%   BMI 22.24 kg/m²         Chest pain: NO       1. Have you been to the ER, urgent care clinic outside [facility] since your last visit?  Hospitalized since your last visit?  NO        Refills:  NO  
controlled ventricular rate by RV pacing       Remote pacemaker checks q 3 months.  EP clinic follow up in 3 months           Thank you for involving me in this patient's care and please call with further concerns or questions.     Future Appointments   Date Time Provider Department Center   5/8/2025 10:00 AM PACEMAKER, [facility]THA [facility]   5/8/2025 10:20 AM Luma Figueredo, APRN - NP [facility] BS AMB        Pedro Orlando M.D.   Electrophysiology/Cardiology   [facility] [place]   [address] [facility]                      [address] [facility]   [place] [address]                             [facility] [address] [phone]

## 2024-07-07 PROCEDURE — 93294 REM INTERROG EVL PM/LDLS PM: CPT | Performed by: INTERNAL MEDICINE

## 2024-08-08 ENCOUNTER — TELEPHONE (OUTPATIENT)
Age: 89
End: 2024-08-08

## 2024-08-08 NOTE — TELEPHONE ENCOUNTER
Telephone call placed to patient.  Identity verified with two patient identifiers.  Pt was not home, but spoke w/ spouse, Una Washington.  Discussed d/t pt's battery life < 10 months on cardiac device, annual appt rescheduled for 1/25.  Confirmed date and time change.    Opportunities for questions, clarifications, and concerns provided.  Pt expressed understanding.

## 2025-01-01 NOTE — PROGRESS NOTES
[facility] Cardiology  Cardiac Electrophysiology Clinic Care Note                  []Initial visit     [x]Established visit     Patient Name: David Delarosa - :1930 - MRN:[id number]  Primary Cardiologist: None  Electrophysiologist: Pedro Orlando MD     Reason for visit: Pacemaker follow up    HPI:  Mr. Delarosa is a 89 y.o. male who presents for follow up, is s/p St. Rolando dual chamber pacemaker (DOI 2016, leads 2009).     He reports chronic vertigo that drastically limits his activity.  He's also had some chronic stable DEAL.  Denies chest pain or palpitations.     Echo in 2023 showed LVEF 55% with moderate asymmetrical LVH, mildly dilated RV, moderately dilated LA, mildly dilated RA, mild MR with MAC, moderate TR.     Lexiscan cardiolite stress test in 2016 showed normal LVEF, no ischemia.     BP controlled.     Anticoagulated with Eliquis, denies bleeding issues.      Previous:  Admitted in 2024 after fall that resulted in left pelvis fracture.  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  Watchman recommended.    Chronic vertigo, has been evaluated by ENT, has tried many medications without improvement.    NSVT (2019), 2 seconds, 202 bpm.     S/p St. Rolando dual chamber pacemaker (DOI 2016, leads 2009) for 2nd degree AVB.  Pacemaker initially implanted by Dr. Kim.     AFL ablation had been recommended by Dr. Lloyd, but patient sought out 2nd opinion.  Given lack of symptoms, ablation not recommended by Dr. Orlando.       Assessment and Plan       ICD-10-CM    1. Pacemaker  Z95.0       2. Persistent atrial fibrillation (HCC)  I48.19 ECHO COMPLETE ADULT (TTE) W OR WO CONTR- Clinic Performed      3. Typical atrial flutter (HCC)  I48.3       4. Nonrheumatic tricuspid valve regurgitation  I36.1       5. Anticoagulated  Z79.01       6. Vertigo  R42       7. SSS (sick sinus syndrome) (HCC)  I49.5           St. Rolando dual chamber pacemaker (gen change

## 2025-01-09 ENCOUNTER — OFFICE VISIT (OUTPATIENT)
Age: 89
End: 2025-01-09
Payer: MEDICARE

## 2025-01-09 ENCOUNTER — PROCEDURE VISIT (OUTPATIENT)
Age: 89
End: 2025-01-09

## 2025-01-09 VITALS
HEART RATE: 60 BPM | OXYGEN SATURATION: 98 % | BODY MASS INDEX: 23.96 KG/M2 | DIASTOLIC BLOOD PRESSURE: 60 MMHG | WEIGHT: 167.4 LBS | SYSTOLIC BLOOD PRESSURE: 130 MMHG | HEIGHT: 70 IN

## 2025-01-09 DIAGNOSIS — I48.3 TYPICAL ATRIAL FLUTTER (HCC): ICD-10-CM

## 2025-01-09 DIAGNOSIS — Z79.01 ANTICOAGULATED: ICD-10-CM

## 2025-01-09 DIAGNOSIS — Z95.0 PACEMAKER: Primary | ICD-10-CM

## 2025-01-09 DIAGNOSIS — Z95.0 PRESENCE OF CARDIAC PACEMAKER: Primary | ICD-10-CM

## 2025-01-09 DIAGNOSIS — I36.1 NONRHEUMATIC TRICUSPID VALVE REGURGITATION: ICD-10-CM

## 2025-01-09 DIAGNOSIS — I49.5 SSS (SICK SINUS SYNDROME) (HCC): ICD-10-CM

## 2025-01-09 DIAGNOSIS — I48.19 PERSISTENT ATRIAL FIBRILLATION (HCC): ICD-10-CM

## 2025-01-09 DIAGNOSIS — R42 VERTIGO: ICD-10-CM

## 2025-01-09 PROCEDURE — 1159F MED LIST DOCD IN RCRD: CPT | Performed by: NURSE PRACTITIONER

## 2025-01-09 PROCEDURE — G8427 DOCREV CUR MEDS BY ELIG CLIN: HCPCS | Performed by: NURSE PRACTITIONER

## 2025-01-09 PROCEDURE — 1036F TOBACCO NON-USER: CPT | Performed by: NURSE PRACTITIONER

## 2025-01-09 PROCEDURE — G8420 CALC BMI NORM PARAMETERS: HCPCS | Performed by: NURSE PRACTITIONER

## 2025-01-09 PROCEDURE — 99214 OFFICE O/P EST MOD 30 MIN: CPT | Performed by: NURSE PRACTITIONER

## 2025-01-09 PROCEDURE — 1126F AMNT PAIN NOTED NONE PRSNT: CPT | Performed by: NURSE PRACTITIONER

## 2025-01-09 PROCEDURE — 1123F ACP DISCUSS/DSCN MKR DOCD: CPT | Performed by: NURSE PRACTITIONER

## 2025-01-09 NOTE — PROGRESS NOTES
Chief Complaint   Patient presents with    Atrial Fibrillation    Dizziness     Vitals:    01/09/25 0949   BP: 130/60   Site: Right Upper Arm   Position: Sitting   Pulse: 60   SpO2: 98%   Weight: 75.9 kg (167 lb 6.4 oz)   Height: 1.778 m (5' 10\")         Chest pain: DENIED     Recent hospital stays: DENIED     Refills: DENIED

## 2025-05-18 PROCEDURE — 93294 REM INTERROG EVL PM/LDLS PM: CPT | Performed by: INTERNAL MEDICINE

## 2025-06-04 DIAGNOSIS — I48.92 ATRIAL FLUTTER, UNSPECIFIED TYPE (HCC): ICD-10-CM

## 2025-06-04 DIAGNOSIS — I36.1 NONRHEUMATIC TRICUSPID VALVE REGURGITATION: ICD-10-CM

## 2025-06-04 DIAGNOSIS — I44.1 ATRIOVENTRICULAR BLOCK, SECOND DEGREE: ICD-10-CM

## 2025-06-04 DIAGNOSIS — I48.3 TYPICAL ATRIAL FLUTTER (HCC): ICD-10-CM

## 2025-06-04 DIAGNOSIS — R42 DIZZINESS AND GIDDINESS: ICD-10-CM

## 2025-06-04 DIAGNOSIS — Z95.0 PACEMAKER: ICD-10-CM

## 2025-06-04 DIAGNOSIS — R29.6 FALLS FREQUENTLY: ICD-10-CM

## 2025-06-04 DIAGNOSIS — I48.19 PERSISTENT ATRIAL FIBRILLATION (HCC): Primary | ICD-10-CM

## 2025-06-04 DIAGNOSIS — I48.21 PERMANENT ATRIAL FIBRILLATION (HCC): ICD-10-CM

## 2025-06-04 DIAGNOSIS — I44.2 COMPLETE AV BLOCK (HCC): ICD-10-CM

## 2025-06-04 DIAGNOSIS — I49.5 SSS (SICK SINUS SYNDROME) (HCC): ICD-10-CM

## 2025-06-04 DIAGNOSIS — Z95.0 PRESENCE OF CARDIAC PACEMAKER: ICD-10-CM

## 2025-06-09 ENCOUNTER — HOSPITAL ENCOUNTER (OUTPATIENT)
Facility: HOSPITAL | Age: 89
Discharge: HOME | End: 2025-06-09
Attending: EMERGENCY MEDICINE
Payer: MEDICARE

## 2025-06-09 ENCOUNTER — ANCILLARY PROCEDURE (OUTPATIENT)
Age: 89
End: 2025-06-09
Payer: MEDICARE

## 2025-06-09 VITALS
SYSTOLIC BLOOD PRESSURE: 151 MMHG | HEART RATE: 81 BPM | TEMPERATURE: 98.4 F | RESPIRATION RATE: 14 BRPM | DIASTOLIC BLOOD PRESSURE: 70 MMHG

## 2025-06-09 VITALS
HEART RATE: 42 BPM | HEIGHT: 70 IN | SYSTOLIC BLOOD PRESSURE: 148 MMHG | WEIGHT: 167 LBS | BODY MASS INDEX: 23.91 KG/M2 | DIASTOLIC BLOOD PRESSURE: 80 MMHG

## 2025-06-09 DIAGNOSIS — I49.5 SSS (SICK SINUS SYNDROME) (HCC): ICD-10-CM

## 2025-06-09 DIAGNOSIS — I48.21 PERMANENT ATRIAL FIBRILLATION (HCC): ICD-10-CM

## 2025-06-09 DIAGNOSIS — I44.1 ATRIOVENTRICULAR BLOCK, SECOND DEGREE: ICD-10-CM

## 2025-06-09 DIAGNOSIS — L97.922 NON-PRESSURE CHRONIC ULCER OF LEFT LOWER LEG WITH FAT LAYER EXPOSED (HCC): ICD-10-CM

## 2025-06-09 DIAGNOSIS — R42 DIZZINESS AND GIDDINESS: ICD-10-CM

## 2025-06-09 DIAGNOSIS — I48.3 TYPICAL ATRIAL FLUTTER (HCC): ICD-10-CM

## 2025-06-09 DIAGNOSIS — I48.19 PERSISTENT ATRIAL FIBRILLATION (HCC): ICD-10-CM

## 2025-06-09 DIAGNOSIS — Z95.0 PACEMAKER: ICD-10-CM

## 2025-06-09 DIAGNOSIS — I44.2 COMPLETE AV BLOCK (HCC): ICD-10-CM

## 2025-06-09 DIAGNOSIS — Z95.0 PRESENCE OF CARDIAC PACEMAKER: ICD-10-CM

## 2025-06-09 DIAGNOSIS — R29.6 FALLS FREQUENTLY: ICD-10-CM

## 2025-06-09 DIAGNOSIS — I48.92 ATRIAL FLUTTER, UNSPECIFIED TYPE (HCC): ICD-10-CM

## 2025-06-09 DIAGNOSIS — I36.1 NONRHEUMATIC TRICUSPID VALVE REGURGITATION: ICD-10-CM

## 2025-06-09 DIAGNOSIS — S51.012A SKIN TEAR OF LEFT ELBOW WITHOUT COMPLICATION, INITIAL ENCOUNTER: Primary | ICD-10-CM

## 2025-06-09 PROBLEM — L98.492 SKIN ULCER OF UPPER ARM WITH FAT LAYER EXPOSED (HCC): Status: RESOLVED | Noted: 2023-11-22 | Resolved: 2025-06-09

## 2025-06-09 LAB
ECHO AO ASC DIAM: 3.3 CM
ECHO AO ASCENDING AORTA INDEX: 1.71 CM/M2
ECHO AO ROOT DIAM: 3.3 CM
ECHO AO ROOT INDEX: 1.71 CM/M2
ECHO AV AREA PEAK VELOCITY: 2.8 CM2
ECHO AV AREA VTI: 2.5 CM2
ECHO AV AREA/BSA PEAK VELOCITY: 1.5 CM2/M2
ECHO AV AREA/BSA VTI: 1.3 CM2/M2
ECHO AV MEAN GRADIENT: 3 MMHG
ECHO AV MEAN VELOCITY: 0.8 M/S
ECHO AV PEAK GRADIENT: 5 MMHG
ECHO AV PEAK VELOCITY: 1.1 M/S
ECHO AV VELOCITY RATIO: 0.91
ECHO AV VTI: 22.5 CM
ECHO BSA: 1.93 M2
ECHO EST RA PRESSURE: 8 MMHG
ECHO LA DIAMETER INDEX: 2.28 CM/M2
ECHO LA DIAMETER: 4.4 CM
ECHO LA TO AORTIC ROOT RATIO: 1.33
ECHO LA VOL A-L A2C: 92 ML (ref 18–58)
ECHO LA VOL A-L A4C: 120 ML (ref 18–58)
ECHO LA VOL BP: 106 ML (ref 18–58)
ECHO LA VOL MOD A2C: 89 ML (ref 18–58)
ECHO LA VOL MOD A4C: 116 ML (ref 18–58)
ECHO LA VOL/BSA BIPLANE: 55 ML/M2 (ref 16–34)
ECHO LA VOLUME AREA LENGTH: 110 ML
ECHO LA VOLUME INDEX A-L A2C: 48 ML/M2 (ref 16–34)
ECHO LA VOLUME INDEX A-L A4C: 62 ML/M2 (ref 16–34)
ECHO LA VOLUME INDEX AREA LENGTH: 57 ML/M2 (ref 16–34)
ECHO LA VOLUME INDEX MOD A2C: 46 ML/M2 (ref 16–34)
ECHO LA VOLUME INDEX MOD A4C: 60 ML/M2 (ref 16–34)
ECHO LV EF PHYSICIAN: 55 %
ECHO LV FRACTIONAL SHORTENING: 30 % (ref 28–44)
ECHO LV INTERNAL DIMENSION DIASTOLE INDEX: 2.38 CM/M2
ECHO LV INTERNAL DIMENSION DIASTOLIC: 4.6 CM (ref 4.2–5.9)
ECHO LV INTERNAL DIMENSION SYSTOLIC INDEX: 1.66 CM/M2
ECHO LV INTERNAL DIMENSION SYSTOLIC: 3.2 CM
ECHO LV IVSD: 1.3 CM (ref 0.6–1)
ECHO LV MASS 2D: 230.2 G (ref 88–224)
ECHO LV MASS INDEX 2D: 119.3 G/M2 (ref 49–115)
ECHO LV POSTERIOR WALL DIASTOLIC: 1.3 CM (ref 0.6–1)
ECHO LV RELATIVE WALL THICKNESS RATIO: 0.57
ECHO LVOT AREA: 3.1 CM2
ECHO LVOT AV VTI INDEX: 0.81
ECHO LVOT DIAM: 2 CM
ECHO LVOT MEAN GRADIENT: 2 MMHG
ECHO LVOT PEAK GRADIENT: 4 MMHG
ECHO LVOT PEAK VELOCITY: 1 M/S
ECHO LVOT STROKE VOLUME INDEX: 29.8 ML/M2
ECHO LVOT SV: 57.5 ML
ECHO LVOT VTI: 18.3 CM
ECHO RA AREA 4C: 32.2 CM2
ECHO RA END SYSTOLIC VOLUME APICAL 4 CHAMBER INDEX BSA: 66 ML/M2
ECHO RA VOLUME: 127 ML
ECHO RIGHT VENTRICULAR SYSTOLIC PRESSURE (RVSP): 55 MMHG
ECHO RV INTERNAL DIMENSION: 4.2 CM
ECHO RV TAPSE: 2.3 CM (ref 1.7–?)
ECHO TV REGURGITANT MAX VELOCITY: 3.43 M/S
ECHO TV REGURGITANT PEAK GRADIENT: 47 MMHG

## 2025-06-09 PROCEDURE — 93306 TTE W/DOPPLER COMPLETE: CPT | Performed by: SPECIALIST

## 2025-06-09 PROCEDURE — 99213 OFFICE O/P EST LOW 20 MIN: CPT

## 2025-06-09 PROCEDURE — 99202 OFFICE O/P NEW SF 15 MIN: CPT | Performed by: SURGERY

## 2025-06-09 ASSESSMENT — PAIN DESCRIPTION - LOCATION: LOCATION: ARM

## 2025-06-09 ASSESSMENT — PAIN DESCRIPTION - ORIENTATION: ORIENTATION: LEFT

## 2025-06-09 NOTE — FLOWSHEET NOTE
06/09/25 1320   Anesthetic   Anesthetic 4% Lidocaine Liquid Topical   Right Leg Edema Point of Measurement   Leg circumference 32 cm   Ankle circumference 23 cm   Left Leg Edema Point of Measurement   Leg circumference 32 cm   Ankle circumference 23.5 cm   RLE Neurovascular Assessment   Capillary Refill Less than/Equal to 3 seconds   Color Purple;Red;Jaspreet   Temperature Cool   R Pedal Pulse +2   LLE Neurovascular Assessment   Capillary Refill Less than/Equal to 3 seconds   Color Jaspreet;Red   Temperature Cool   L Pedal Pulse +2   Wound 06/09/25 Leg Right;Lateral;Lower #2   Date First Assessed/Time First Assessed: 06/09/25 1317   Present on Original Admission: No  Wound Approximate Age at First Assessment (Weeks): 1 weeks  Location: Leg  Wound Location Orientation: Right;Lateral;Lower  Wound Description (Comments): #2   Wound Image    Wound Etiology Traumatic   Dressing Status Old drainage noted   Wound Cleansed Cleansed with saline   Wound Length (cm) 1.3 cm   Wound Width (cm) 1.8 cm   Wound Depth (cm) 0.1 cm   Wound Surface Area (cm^2) 2.34 cm^2   Wound Volume (cm^3) 0.234 cm^3   Wound Assessment Pink/red   Drainage Amount Small (< 25%)   Drainage Description Serous   Odor None   Pratima-wound Assessment Fragile   Margins Flat/open edges   Wound Thickness Description not for Pressure Injury Partial thickness     BP (!) 151/70   Pulse 81   Temp 98.4 °F (36.9 °C) (Temporal)   Resp 14

## 2025-06-09 NOTE — FLOWSHEET NOTE
06/09/25 1321   Wound 06/09/25 Leg Left;Medial;Lower #3   Date First Assessed/Time First Assessed: 06/09/25 1318   Present on Original Admission: Yes  Wound Approximate Age at First Assessment (Weeks): 1 weeks  Location: Leg  Wound Location Orientation: Left;Medial;Lower  Wound Description (Comments): #3   Wound Image    Wound Etiology Traumatic   Dressing Status Old drainage noted   Wound Cleansed Cleansed with saline   Wound Length (cm) 1 cm   Wound Width (cm) 0.9 cm   Wound Depth (cm) 0.1 cm   Wound Surface Area (cm^2) 0.9 cm^2   Wound Volume (cm^3) 0.09 cm^3   Wound Assessment Pink/red   Drainage Amount Small (< 25%)   Drainage Description Serous   Odor None   Pratima-wound Assessment Blanchable erythema;Fragile   Margins Flat/open edges   Wound Thickness Description not for Pressure Injury Partial thickness     BP (!) 151/70   Pulse 81   Temp 98.4 °F (36.9 °C) (Temporal)   Resp 14

## 2025-06-09 NOTE — PATIENT INSTRUCTIONS
Tubigrip []Right Leg Double Layer []Left Leg Double Layer      []Right Leg Single Layer []Left Leg Single Layer     [] Elevate leg(s)  when sitting.      [] Avoid prolonged standing in one place.      Dietary:  [x] Increase Protein: examples ( Meat, cheese, eggs, greek yogurt, premier protein drink, fish, nuts )   [] Isaiah  [] Protien drink supplement   [] Recommended Supplements :      Activity:  Activity as tolerated:    [x] Patient has no activity restrictions       [] Strict Bedrest:   [] Remain off Work:     [] Return to work with restrictions:    [] May return to full duty work:                                              Return Appointment:    [x] Return Appointment: With Dr. Jorge Herndon  in  3 Week(s)    [] Nurse visit scheduled in  day(s) or  week(s)       Wound Care Center Information: Should you experience any significant changes in your wound(s) or have questions about your wound care, please contact the [facility] at MONDAY-THURSDAY 8:00 am - 4:30 AND Friday 8:00 AM- 12:00 PM .  If you need help with your wound outside these hours and cannot wait until we are again available, contact your PCP or go to the hospital emergency room.     PLEASE NOTE: IF YOU ARE UNABLE TO OBTAIN WOUND SUPPLIES, CONTINUE TO USE THE SUPPLIES YOU HAVE AVAILABLE UNTIL YOU ARE ABLE TO REACH US. IT IS MOST IMPORTANT TO KEEP THE WOUND COVERED AT ALL TIMES.     Physician Signature:_______________________ Dr. Jorge Herndon

## 2025-06-09 NOTE — PROGRESS NOTES
Wound care    The patient is a 89-year-old man who presents to the wound care center regarding wound on left elbow and right and left lower legs.  He has a history of frequent falls with skin and soft tissue injury.    I saw the patient for the first time in the wound care center on 6/9/2025.    The patient reports a long history of vertigo.  He reports that previous evaluations have not found a treatable process.    Past medical history includes pacemaker, atrial flutter, history of anticoagulation.  The patient still drives.  He does not report active heart or lung symptoms.  He is  and lives with his wife.  She has previously done his dressing changes.        Reported weight 167 pounds height 5 feet 10 inches    Physical examination    Vital signs blood pressure 151/70 pulse 81 temperature 98.4 respirations 14    The patient is an alert man in no acute distress.  He is hard of hearing.    Examination of the left upper extremity revealed 2+ radial pulse.  There was no edema of the left arm.  There was a wound on the outer aspect of the left elbow with dimensions 6.3 x 0.7 x 0.1 with pink base and adherent superficial flap.    Examination of the right lower extremity did not reveal palpable dorsalis pedis or posterior tibial pulse.  Doppler signal at the right dorsalis pedis position was brisk and normal.  There was trace edema of the right lower leg.  On the proximal lateral aspect of the right lower leg there was a wound 1.3 x 1.8 x 0.1 cm with pink base.    Examination of the left lower extremity revealed 2+ dorsalis pedis pulse.  There was trace edema of the left lower leg.  On the medial aspect of the mid left lower leg there was an ulcer 1 x 0.9 x 0.1 cm with pink base.        Impression:    Skin tear at left elbow.    Nonpressure ulcer right lower leg with fat layer exposed.    Nonpressure ulcer left lower leg with fat layer exposed.      Plan:    Dressings ordered: Cover all wounds with Xeroform then

## 2025-06-09 NOTE — FLOWSHEET NOTE
06/09/25 1351   Wound 06/09/25 Elbow Left #1   Date First Assessed/Time First Assessed: 06/09/25 1317   Present on Original Admission: Yes  Wound Approximate Age at First Assessment (Weeks): 1 weeks  Location: Elbow  Wound Location Orientation: Left  Wound Description (Comments): #1   Dressing/Treatment Xeroform;Gauze dressing/dressing sponge;Roll gauze   Wound 06/09/25 Leg Right;Lateral;Lower #2   Date First Assessed/Time First Assessed: 06/09/25 1317   Present on Original Admission: No  Wound Approximate Age at First Assessment (Weeks): 1 weeks  Location: Leg  Wound Location Orientation: Right;Lateral;Lower  Wound Description (Comments): #2   Dressing/Treatment Gauze dressing/dressing sponge;Xeroform;Roll gauze   Wound 06/09/25 Leg Left;Medial;Lower #3   Date First Assessed/Time First Assessed: 06/09/25 1318   Present on Original Admission: Yes  Wound Approximate Age at First Assessment (Weeks): 1 weeks  Location: Leg  Wound Location Orientation: Left;Medial;Lower  Wound Description (Comments): #3   Dressing/Treatment Xeroform;Gauze dressing/dressing sponge;Roll gauze     Discharge Condition: Stable    Pain: 3    Ambulatory Status:Walking    Discharge Destination: Home    Transportation:Car    Accompanied by: Self    Discharge instructions reviewed with Self and copy or written instructions have been provided. All questions/concerns have been addressed at this time.

## 2025-06-09 NOTE — FLOWSHEET NOTE
06/09/25 1319   Wound 06/09/25 Elbow Left #1   Date First Assessed/Time First Assessed: 06/09/25 1317   Present on Original Admission: Yes  Wound Approximate Age at First Assessment (Weeks): 1 weeks  Location: Elbow  Wound Location Orientation: Left  Wound Description (Comments): #1   Wound Image    Wound Etiology Traumatic   Dressing Status Old drainage noted   Wound Cleansed Cleansed with saline   Wound Length (cm) 6.3 cm   Wound Width (cm) 0.7 cm   Wound Depth (cm) 0.1 cm   Wound Surface Area (cm^2) 4.41 cm^2   Wound Volume (cm^3) 0.441 cm^3   Wound Assessment [place]/red;Slough   Drainage Amount Moderate (25-50%)   Drainage Description Serosanguinous   Odor None   Pratima-wound Assessment Blanchable erythema;Fragile   Margins Flat/open edges   Wound Thickness Description not for Pressure Injury Partial thickness     BP (!) 151/70   Pulse 81   Temp 98.4 °F (36.9 °C) (Temporal)   Resp 14

## 2025-06-13 ENCOUNTER — HOSPITAL ENCOUNTER (OUTPATIENT)
Facility: HOSPITAL | Age: 89
Discharge: HOME | End: 2025-06-13
Attending: EMERGENCY MEDICINE

## 2025-06-13 VITALS — TEMPERATURE: 97.8 F | SYSTOLIC BLOOD PRESSURE: 135 MMHG | HEART RATE: 54 BPM | DIASTOLIC BLOOD PRESSURE: 63 MMHG

## 2025-06-13 DIAGNOSIS — R00.1 SYMPTOMATIC BRADYCARDIA: ICD-10-CM

## 2025-06-13 DIAGNOSIS — T82.118A PACEMAKER FAILURE, INITIAL ENCOUNTER: ICD-10-CM

## 2025-06-13 DIAGNOSIS — L98.492 ARM ULCER, WITH FAT LAYER EXPOSED (HCC): Primary | ICD-10-CM

## 2025-06-13 ASSESSMENT — PAIN SCALES - GENERAL: PAINLEVEL_OUTOF10: 5

## 2025-06-13 NOTE — PATIENT INSTRUCTIONS
Discharge Instructions for  [facility]  [address] [place] [address]  Telephone: [phone]   FAX [phone]    NAME:  David Delarosa  YOB: 1930  ACCOUNT NUMBER :  [id number]  DATE:  6/9/2025       : ROMAN NEIL RN     WOUND SUPPLIES:       Wound Cleansing:   Do not scrub or use excessive force.  Cleanse wound prior to applying a clean dressing with:      [] Cleanse wound with: baby shampoo    [] May Shower at Discharge     [x] Shower on days of dressing change, remove dressing first    [] Keep Wound Dry in Shower (may purchase a cast cover if needed)     Topical Treatments:  Do not apply lotions, creams, or ointments to wound bed unless directed.     [] Apply moisturizing lotion A&D ointment  to skin surrounding the wound prior to dressing change.  [] Apply antifungal ointment to skin surrounding the wound prior to dressing change.  [] Apply thin film of Zinc barrier ointment to skin immediately around wound.       Dressings:           Wound Location Left elbow, right lateral leg, left medial leg   Apply Primary Dressing:       [] MediHoney Gel    [] MediHoney Alginate  [] Alginate     [] Alginate with Silver       [] Collagen   [] Collagen with Silver     [] Hydrocolloid  [] Hydrofera Blue Classic (moisten with saline)  [] Hydrofera Blue Ready  [x] Other:  Xeroform      Cover and Secure with:    [x] Gauze   [] Roosevelt   [x] Kerlix  [] Ace Wrap     [] ABD  [x] Medipore tape  [] tape  [x] Other: Surgilast   Avoid contact of tape with skin.    Change dressing:   [] Daily      [x] Every Other Day   [] Three times per week  [] Once a week   [] Do Not Change Dressing     [] Other:  Dressings:                   Wound Location Left Lat lower arm     Apply Primary Dressing:      [x] Xeroform gauze abd kerlix tape       Cover and Secure with:  [] Gauze [x] ABD [] exudry     [] Roosevelt [x] Kerlix [] Mepilex Border  [] Ace Wrap [x] Roll Tape   [x] Other:

## 2025-06-13 NOTE — PROGRESS NOTES
days: 3       Wound 06/09/25 Leg Right;Lateral;Lower #2 (Active)   Wound Image   06/13/25 1016   Wound Etiology Traumatic 06/09/25 1320   Dressing Status Old drainage noted 06/09/25 1320   Wound Cleansed Cleansed with saline 06/13/25 1016   Dressing/Treatment Xeroform;Silicone border 06/13/25 1049   Wound Length (cm) 0.5 cm 06/13/25 1016   Wound Width (cm) 1.2 cm 06/13/25 1016   Wound Depth (cm) 0.2 cm 06/13/25 1016   Wound Surface Area (cm^2) 0.6 cm^2 06/13/25 1016   Change in Wound Size % (l*w) 74.36 06/13/25 1016   Wound Volume (cm^3) 0.12 cm^3 06/13/25 1016   Wound Healing % 49 06/13/25 1016   Wound Assessment Pink/red;Other (Comment) 06/13/25 1016   Drainage Amount Small (< 25%) 06/13/25 1016   Drainage Description Serosanguinous 06/13/25 1016   Odor None 06/13/25 1016   Pratima-wound Assessment Fragile 06/13/25 1016   Margins Flat/open edges 06/13/25 1016   Wound Thickness Description not for Pressure Injury Partial thickness 06/13/25 1016   Number of days: 3       Wound 06/09/25 Leg Left;Medial;Lower #3 (Active)   Wound Image   06/13/25 1016   Wound Etiology Traumatic 06/09/25 1321   Dressing Status Old drainage noted 06/09/25 1321   Wound Cleansed Cleansed with saline 06/13/25 1016   Dressing/Treatment Xeroform;Silicone border 06/13/25 1049   Wound Length (cm) 0.3 cm 06/13/25 1016   Wound Width (cm) 0.2 cm 06/13/25 1016   Wound Depth (cm) 0.1 cm 06/13/25 1016   Wound Surface Area (cm^2) 0.06 cm^2 06/13/25 1016   Change in Wound Size % (l*w) 93.33 06/13/25 1016   Wound Volume (cm^3) 0.006 cm^3 06/13/25 1016   Wound Healing % 93 06/13/25 1016   Wound Assessment Pink/red;Other (Comment) 06/13/25 1016   Drainage Amount Moderate (25-50%) 06/13/25 1016   Drainage Description Serosanguinous 06/13/25 1016   Odor None 06/13/25 1016   Pratima-wound Assessment Fragile 06/13/25 1016   Margins Flat/open edges 06/13/25 1016   Wound Thickness Description not for Pressure Injury Partial thickness 06/13/25 1016   Number of days: 3 
  Pulse 54   Temp 97.8 °F (36.6 °C) (Temporal)     
DJD (degenerative joint disease) of cervical spine     DJD (degenerative joint disease) of hip     Hyperlipidemia     Ill-defined condition 09/2016    Pneumonia    Mobitz (type) II atrioventricular block     Pacemaker     Paroxysmal A-fib (HCC)     Sinoatrial node dysfunction (HCC)     Vertigo     pt states it's a side effect from Covid 2020       PAST SURGICAL HISTORY  Past Surgical History:   Procedure Laterality Date    GEN INSERT/REPLACE ONLY DUAL  8/3/2016         HERNIA REPAIR Right     Inguinal Hernia    MOHS SURGERY      PACEMAKER  2008 & 2016    Right Upper Chest    ROTATOR CUFF REPAIR Right 1998 & 2003    TOTAL KNEE ARTHROPLASTY Left 07/2016    Partial Knee Replacement    UROLOGICAL SURGERY      TURP-for BPH       FAMILY HISTORY  Family History   Problem Relation Age of Onset    Heart Disease Father        SOCIAL HISTORY  Social History     Tobacco Use    Smoking status: Never    Smokeless tobacco: Never   Substance Use Topics    Alcohol use: No     Alcohol/week: 0.0 standard drinks of alcohol    Drug use: No       ALLERGIES  No Known Allergies    MEDICATIONS  Current Outpatient Medications on File Prior to Encounter   Medication Sig Dispense Refill    simvastatin (ZOCOR) 40 MG tablet Take 1 tablet by mouth nightly       No current facility-administered medications on file prior to encounter.       REVIEW OF SYSTEMS  A comprehensive review of systems was negative except noted in HPI/wound narrative and/or updates above.  Written patient dismissal instructions given to patient and signed by patient or POA.  Patient voiced understanding that the importance of adherence to instructions is paramount to wound healing improvement or success.   Patient Instructions   Discharge Instructions for  [facility]  [address] [place] [id number]  Telephone: [phone]   FAX [phone]    NAME:  David Delarosa  YOB: 1930  ACCOUNT NUMBER :  [id number]  DATE:

## 2025-06-13 NOTE — FLOWSHEET NOTE
06/13/25 1049   Wound 06/13/25 Arm Left;Lateral #4   Date First Assessed/Time First Assessed: 06/13/25 1013   Present on Original Admission: Yes  Location: Arm  Wound Location Orientation: Left;Lateral  Wound Description (Comments): #4   Dressing/Treatment Gauze dressing/dressing sponge;Roll gauze;Tape/Soft cloth adhesive tape;Xeroform;ABD   Wound 06/09/25 Elbow Left #1   Date First Assessed/Time First Assessed: 06/09/25 1317   Present on Original Admission: Yes  Wound Approximate Age at First Assessment (Weeks): 1 weeks  Location: Elbow  Wound Location Orientation: Left  Wound Description (Comments): #1   Dressing/Treatment Gauze dressing/dressing sponge;Roll gauze;Tape/Soft cloth adhesive tape;Xeroform   Wound 06/09/25 Leg Left;Medial;Lower #3   Date First Assessed/Time First Assessed: 06/09/25 1318   Present on Original Admission: Yes  Wound Approximate Age at First Assessment (Weeks): 1 weeks  Location: Leg  Wound Location Orientation: Left;Medial;Lower  Wound Description (Comments): #3   Dressing/Treatment Xeroform;Silicone border   Wound 06/09/25 Leg Right;Lateral;Lower #2   Date First Assessed/Time First Assessed: 06/09/25 1317   Present on Original Admission: No  Wound Approximate Age at First Assessment (Weeks): 1 weeks  Location: Leg  Wound Location Orientation: Right;Lateral;Lower  Wound Description (Comments): #2   Dressing/Treatment Xeroform;Silicone border     Discharge Condition: Stable    Pain: 5    Ambulatory Status: Straight Cane    Discharge Destination: home    Transportation:car    Accompanied by: SELF    Discharge instructions reviewed with SELF and copy or written instructions have been provided. All questions/concerns have been addressed at this time.

## 2025-06-16 ENCOUNTER — RESULTS FOLLOW-UP (OUTPATIENT)
Age: 89
End: 2025-06-16

## 2025-06-16 NOTE — TELEPHONE ENCOUNTER
Verified patient with two types of identifiers.  Spoke with wife Una, verified with PHI.  Informed wife of echo results and recommendations per Luma GARCIA.  Patient verbalized understanding and will call with any other questions.      Future Appointments   Date Time Provider Department Center   6/30/2025  1:15 PM Jorge Herndon MD [facility]   7/2/2025  3:00 PM PACEMAKER, [facility]FRANCES [facility]   7/2/2025  3:20 PM Martinez You MD [facility]   9/29/2025 11:00 AM PACEMAKER, [facility]   9/29/2025 11:20 AM Luma Figueredo APRN - NP [facility]           ----- Message from NICOL Pavon NP sent at 6/16/2025  9:43 AM EDT -----  LVEF 55% with mild LVH, mildly dilated RV, severely dilated LA, dilated RA, mild MAC with mild MR, mild TR, & moderate PH.    LVEF remains WNL, thus no device upgrade indicated at time of generator change.

## 2025-06-30 ENCOUNTER — HOSPITAL ENCOUNTER (OUTPATIENT)
Facility: HOSPITAL | Age: 89
Discharge: HOME | End: 2025-06-30
Attending: EMERGENCY MEDICINE
Payer: MEDICARE

## 2025-06-30 VITALS
RESPIRATION RATE: 14 BRPM | DIASTOLIC BLOOD PRESSURE: 67 MMHG | SYSTOLIC BLOOD PRESSURE: 136 MMHG | HEART RATE: 68 BPM | TEMPERATURE: 98.1 F

## 2025-06-30 PROCEDURE — 99213 OFFICE O/P EST LOW 20 MIN: CPT | Performed by: SURGERY

## 2025-06-30 PROCEDURE — 99213 OFFICE O/P EST LOW 20 MIN: CPT

## 2025-06-30 NOTE — PATIENT INSTRUCTIONS
Discharge Instructions for  [facility]  [address] [place] [address]  Telephone: [phone]   FAX [phone]    NAME:  David Delarosa  YOB: 1930  ACCOUNT NUMBER :  [id number]  DATE:  6/30/2025       : ROMAN NEIL RN     WOUND SUPPLIES:       Wound Cleansing:   Do not scrub or use excessive force.  Cleanse wound prior to applying a clean dressing with:      [] Cleanse wound with: baby shampoo    [] May Shower at Discharge     [x] Shower on days of dressing change, remove dressing first    [] Keep Wound Dry in Shower (may purchase a cast cover if needed)     Topical Treatments:  Do not apply lotions, creams, or ointments to wound bed unless directed.     [] Apply moisturizing lotion A&D ointment  to skin surrounding the wound prior to dressing change.  [] Apply antifungal ointment to skin surrounding the wound prior to dressing change.  [] Apply thin film of Zinc barrier ointment to skin immediately around wound.       Dressings:           Wound Location Left elbow, right lateral leg, left medial leg   Apply Primary Dressing:       [] MediHoney Gel    [] MediHoney Alginate  [] Alginate     [] Alginate with Silver       [] Collagen   [] Collagen with Silver     [] Hydrocolloid  [] Hydrofera Blue Classic (moisten with saline)  [] Hydrofera Blue Ready  [x] Other:  Xeroform      Cover and Secure with:    [x] Gauze   [] Roosevelt   [x] Kerlix  [] Ace Wrap     [] ABD  [x] Medipore tape  [] tape  [x] Other: Surgilast   Avoid contact of tape with skin.    Change dressing:   [] Daily      [x] Every Other Day   [] Three times per week  [] Once a week   [] Do Not Change Dressing     [] Other:  Dressings:                   Wound Location Left Lat lower arm     Apply Primary Dressing:      [x] Xeroform gauze abd kerlix tape       Cover and Secure with:  [] Gauze [x] ABD [] exudry     [] Roosevelt [x] Kerlix [] Mepilex Border  [] Ace Wrap [x] Roll Tape   [x] Other:

## 2025-06-30 NOTE — WOUND CARE
Wound Care Documentation:  Wound 06/09/25 Elbow Left #1 (Active)   Wound Image   06/30/25 1338   Wound Etiology Traumatic 06/30/25 1322   Dressing Status Old drainage noted 06/30/25 1322   Wound Cleansed Cleansed with saline 06/30/25 1322   Dressing/Treatment Gauze dressing/dressing sponge;Roll gauze;Tape/Soft cloth adhesive tape;Xeroform 06/13/25 1049   Wound Length (cm) 1.7 cm 06/30/25 1322   Wound Width (cm) 0.4 cm 06/30/25 1322   Wound Depth (cm) 0.1 cm 06/30/25 1322   Wound Surface Area (cm^2) 0.68 cm^2 06/30/25 1322   Change in Wound Size % (l*w) 84.58 06/30/25 1322   Wound Volume (cm^3) 0.068 cm^3 06/30/25 1322   Wound Healing % 85 06/30/25 1322   Wound Assessment Pink/red;Slough 06/30/25 1322   Drainage Amount Moderate (25-50%) 06/30/25 1322   Drainage Description Serosanguinous 06/30/25 1322   Odor None 06/30/25 1322   Pratima-wound Assessment Dry/flaky;Blanchable erythema;Fragile 06/30/25 1322   Margins Flat/open edges 06/30/25 1322   Wound Thickness Description not for Pressure Injury Partial thickness 06/30/25 1322   Number of days: 21       Wound 06/09/25 Leg Right;Lateral;Lower #2 (Active)   Wound Image   06/30/25 1329   Wound Etiology Traumatic 06/30/25 1322   Dressing Status Old drainage noted 06/30/25 1322   Wound Cleansed Cleansed with saline 06/30/25 1322   Dressing/Treatment Xeroform;Silicone border 06/13/25 1049   Wound Length (cm) 0.3 cm 06/30/25 1322   Wound Width (cm) 0.3 cm 06/30/25 1322   Wound Depth (cm) 0.1 cm 06/30/25 1322   Wound Surface Area (cm^2) 0.09 cm^2 06/30/25 1322   Change in Wound Size % (l*w) 96.15 06/30/25 1322   Wound Volume (cm^3) 0.009 cm^3 06/30/25 1322   Wound Healing % 96 06/30/25 1322   Wound Assessment Pink/red 06/30/25 1322   Drainage Amount Small (< 25%) 06/30/25 1322   Drainage Description Serous 06/30/25 1322   Odor None 06/30/25 1322   Pratima-wound Assessment Fragile 06/30/25 1322   Margins Flat/open edges 06/30/25 1322   Wound Thickness Description not for Pressure

## 2025-06-30 NOTE — PROGRESS NOTES
Wound care    The patient is a 89-year-old man who presents to the wound care center regarding wound on left elbow and right and left lower legs.  He has a history of frequent falls with skin and soft tissue injury.    I saw the patient for the first time in the wound care center on 6/9/2025.    The patient reports a long history of vertigo.  He reports that previous evaluations have not found a treatable process.    Past medical history includes pacemaker, atrial flutter, history of anticoagulation.  The patient still drives.  He does not report active heart or lung symptoms.  He is  and lives with his wife.  She has previously done his dressing changes.      Dressings as of 6/9/2025: Cover all wounds with Xeroform then dry gauze and roll gauze.  Change dressings every other day.        Reported weight 167 pounds height 5 feet 10 inches    Physical examination    The patient is an alert man in no acute distress.  He is hard of hearing.    Examination of the left upper extremity revealed 2+ radial pulse.  There was no edema of the left arm.  There was a wound on the outer aspect of the left elbow with dimensions 4 x 2.6 x 0.2 with pink base\.  Elbow skin tear was 1.7 x 0.4 x 0.1 with pink surface.    Examination of the right lower extremity did not reveal palpable dorsalis pedis or posterior tibial pulse.  Doppler signal at the right dorsalis pedis position was brisk and normal.  There was trace edema of the right lower leg.  On the proximal lateral aspect of the right lower leg there was a wound 0.3 x 0.3 x 0.1 cm with pink base.    Examination of the left lower extremity revealed 2+ dorsalis pedis pulse.  There was trace edema of the left lower leg.  On the medial aspect of the mid left lower leg there was an ulcer 0.1 x 0.1 x 0.1 cm with pink base.        Impression:    Skin tear at left elbow.    Nonpressure ulcer right lower leg with fat layer exposed.    Nonpressure ulcer left lower leg with fat layer

## 2025-06-30 NOTE — FLOWSHEET NOTE
06/30/25 1447   Wound 06/09/25 Leg Right;Lateral;Lower #2   Date First Assessed/Time First Assessed: 06/09/25 1317   Present on Original Admission: No  Wound Approximate Age at First Assessment (Weeks): 1 weeks  Location: Leg  Wound Location Orientation: Right;Lateral;Lower  Wound Description (Comments): #2   Dressing Status New dressing applied   Dressing/Treatment Xeroform;ABD;Roll gauze;Tape/Soft cloth adhesive tape   Wound 06/09/25 Elbow Left #1   Date First Assessed/Time First Assessed: 06/09/25 1317   Present on Original Admission: Yes  Wound Approximate Age at First Assessment (Weeks): 1 weeks  Location: Elbow  Wound Location Orientation: Left  Wound Description (Comments): #1   Dressing Status New dressing applied   Dressing/Treatment Xeroform;Gauze dressing/dressing sponge;Roll gauze;Tape/Soft cloth adhesive tape   Wound 06/13/25 Arm Left;Lateral #4   Date First Assessed/Time First Assessed: 06/13/25 1013   Present on Original Admission: Yes  Location: Arm  Wound Location Orientation: Left;Lateral  Wound Description (Comments): #4   Dressing Status New dressing applied   Dressing/Treatment Xeroform;Gauze dressing/dressing sponge;Roll gauze;Tape/Soft cloth adhesive tape

## 2025-07-02 ENCOUNTER — PROCEDURE VISIT (OUTPATIENT)
Age: 89
End: 2025-07-02

## 2025-07-02 ENCOUNTER — OFFICE VISIT (OUTPATIENT)
Age: 89
End: 2025-07-02
Payer: MEDICARE

## 2025-07-02 ENCOUNTER — TELEPHONE (OUTPATIENT)
Age: 89
End: 2025-07-02

## 2025-07-02 VITALS
HEART RATE: 82 BPM | HEIGHT: 70 IN | SYSTOLIC BLOOD PRESSURE: 140 MMHG | OXYGEN SATURATION: 97 % | DIASTOLIC BLOOD PRESSURE: 60 MMHG | WEIGHT: 153.4 LBS | BODY MASS INDEX: 21.96 KG/M2 | RESPIRATION RATE: 16 BRPM

## 2025-07-02 DIAGNOSIS — I49.5 SINOATRIAL NODE DYSFUNCTION (HCC): Primary | ICD-10-CM

## 2025-07-02 DIAGNOSIS — Z95.0 CARDIAC PACEMAKER IN SITU: ICD-10-CM

## 2025-07-02 DIAGNOSIS — Z95.0 PACEMAKER: ICD-10-CM

## 2025-07-02 DIAGNOSIS — Z95.0 PRESENCE OF CARDIAC PACEMAKER: Primary | ICD-10-CM

## 2025-07-02 DIAGNOSIS — I49.5 SINOATRIAL NODE DYSFUNCTION (HCC): ICD-10-CM

## 2025-07-02 DIAGNOSIS — I48.92 ATRIAL FLUTTER, PAROXYSMAL (HCC): Primary | ICD-10-CM

## 2025-07-02 PROCEDURE — G8420 CALC BMI NORM PARAMETERS: HCPCS | Performed by: HOSPITALIST

## 2025-07-02 PROCEDURE — 1123F ACP DISCUSS/DSCN MKR DOCD: CPT | Performed by: HOSPITALIST

## 2025-07-02 PROCEDURE — 99214 OFFICE O/P EST MOD 30 MIN: CPT | Performed by: HOSPITALIST

## 2025-07-02 PROCEDURE — 1126F AMNT PAIN NOTED NONE PRSNT: CPT | Performed by: HOSPITALIST

## 2025-07-02 PROCEDURE — 1159F MED LIST DOCD IN RCRD: CPT | Performed by: HOSPITALIST

## 2025-07-02 PROCEDURE — 1036F TOBACCO NON-USER: CPT | Performed by: HOSPITALIST

## 2025-07-02 PROCEDURE — 93005 ELECTROCARDIOGRAM TRACING: CPT | Performed by: HOSPITALIST

## 2025-07-02 PROCEDURE — G8427 DOCREV CUR MEDS BY ELIG CLIN: HCPCS | Performed by: HOSPITALIST

## 2025-07-02 PROCEDURE — 93010 ELECTROCARDIOGRAM REPORT: CPT | Performed by: HOSPITALIST

## 2025-07-02 NOTE — PATIENT INSTRUCTIONS
or pain for longer than the first 5 days after the implant.    Drainage from the incision site.    Swelling around the incision site.    Shortness of breath.    Dizziness, lightheadedness, fainting.    Slow pulse below 40 beats per minute.    REMEMBER: If you feel something is an emergency or cannot be handled over the phone, call 911 or go to the closest emergency room.    FOLLOW UP        If there are scheduling issues/concerns, please contact Farhana at [phone] for further assistance.  For clinical questions/concerns, please send a Datto message for a quicker response from the nurse.      Martinez You MD  Cardiac Electrophysiology / Cardiology    [facility] [place]  [facility]  [address] [address], [address]        [address] [place] [address], [facility] [address]  [address]  [address]  [phone] / [phone] Fax       [phone] / [phone] Fax

## 2025-07-02 NOTE — PROGRESS NOTES
[facility] PREP NOTE  Please fill out subjective and AF history below.  ***Dr. You will DELETE before signing visit  ===========================================    Primary Cardiologist: none  Prior [place]    He is retired    David Delarosa presents to electrophysiology clinic for management of CIED (Cardiac Implantable Electronic Device).    His current medical conditions and PMH are detailed below.      Today's visit:  Date: 7/2/2025    He has had 8 falls in the last year due to his significant vertigo. He denies chest pain, dyspnea or syncope. He is no longer on Eliquis and doesn't remember when this was stopped.       Cardiac Rhythm Testing     All EKGs were personally interpreted by me as below      Monitor results:  ***    ===================================================================      # [employer] dual chamber pacemaker   Today's interrogation shows normal lead and device function   5.3 months on battery   2% A-paced  68% V-paced  99% AF burden    # Permanent atrial fibrillation/flutter    # Anticoagulation  [id number] score of 2 [for Age-2]  Anticoagulation: none      # Chronic vertigo  # Recurrent falls   Ongoing for 5 years   Has been evaluated by ENT and had head imaging   8 falls in the last year   We discussed not driving

## 2025-07-02 NOTE — PROGRESS NOTES
had no chief complaint listed for this encounter.    Vitals:    07/02/25 1504   BP: (!) 140/60   BP Site: Right Upper Arm   Patient Position: Sitting   Pulse: (!) 41   Resp: 16   SpO2: 97%   Weight: 69.6 kg (153 lb 6.4 oz)   Height: 1.778 m (5' 10\")        Chest pain No    Refills No        1. Have you been to the ER, urgent care clinic since your last visit? No       Hospitalized since your last visit? No       Where?        Date?

## 2025-07-09 NOTE — PROGRESS NOTES
Cardiac Electrophysiology OFFICE Follow-up Note     Subjective:      David Delarosa is a pleasant 89 y.o. male.  He is a resident of [facility] [address].    Primary Cardiologist: none  Prior [place]    He is retired    David Delarosa presents to electrophysiology clinic for management of CIED (Cardiac Implantable Electronic Device).    His current medical conditions and PMH are detailed below.      Today's visit:  Date: 7/2/2025  He has had 8 falls in the last year due to his significant vertigo. He denies chest pain, dyspnea or syncope. He is no longer on Eliquis and doesn't remember when this was stopped.   He is very hard of hearing.  He is also very emotional regarding his medical issues and states that he feels miserable.      Cardiac Rhythm Testing     All EKGs were personally interpreted by me as below    EKG from 7/2/2025 shows atrial fibrillation, ventricular paced beats with frequent PVCs          Assessment:       ICD-10-CM    1. Atrial flutter, paroxysmal (HCC)  I48.92 EKG 12 Lead      2. Pacemaker  Z95.0       3. Sinoatrial node dysfunction (HCC)  I49.5           # [employer]ando dual chamber pacemaker   Today's interrogation shows normal lead and device function   5.3 months on battery   2% A-paced  68% V-paced  99% AF burden  -- Scheduled generator change procedure which he will need in ~5 months time    # Frequent PVCs  Echocardiogram in June 25 showed LVEF 55%.  Given advanced age, we will continue conservative management only      # Permanent atrial fibrillation/flutter with slow ventricular response  # Anticoagulation  [id number] score of 2 [for Age-2]  Anticoagulation: none  Long discussion with patient regarding his risk of stroke with permanent atrial fibrillation.    Anticoagulation was stopped previously due to his risk of recurrent falls. He remains at high risk of bleeding with anticoagulation as well.  I discussed with him that this is a difficult situation.  Given his very advanced age,

## 2025-07-10 ENCOUNTER — TELEPHONE (OUTPATIENT)
Age: 89
End: 2025-07-10

## 2025-07-10 NOTE — TELEPHONE ENCOUNTER
Verified patient with two types of identifiers.  Informed patient that his appointments on 9/29 have been cancelled, as they are not needed.  Confirmed appointment on 10/13; time and location confirmed.  Let patient know that we will give him an updated appointment list at that time.  Patient verbalized understanding and will call with any other questions.        Future Appointments   Date Time Provider Department Center   7/21/2025  1:15 PM Jorge Herndon MD [facility]   10/13/2025  1:00 PM Luma Figueredo APRN - NP [facility]   11/17/2025 10:00 AM PACEMAKER, [facility]FRANCES [facility] AM[facility]   1/30/2026 10:00 AM PACEMAKER, [facility]FRANCES [facility] AM[facility]   1/30/2026 10:20 AM Martinez You MD [facility] AMB

## 2025-07-21 ENCOUNTER — HOSPITAL ENCOUNTER (OUTPATIENT)
Facility: HOSPITAL | Age: 89
Discharge: HOME | End: 2025-07-21
Attending: EMERGENCY MEDICINE
Payer: MEDICARE

## 2025-07-21 VITALS
TEMPERATURE: 97.5 F | WEIGHT: 155 LBS | RESPIRATION RATE: 16 BRPM | SYSTOLIC BLOOD PRESSURE: 131 MMHG | BODY MASS INDEX: 22.24 KG/M2 | DIASTOLIC BLOOD PRESSURE: 82 MMHG | HEART RATE: 62 BPM

## 2025-07-21 PROBLEM — L97.912 NON-PRESSURE CHRONIC ULCER OF RIGHT LOWER LEG WITH FAT LAYER EXPOSED (HCC): Status: RESOLVED | Noted: 2023-11-08 | Resolved: 2025-07-21

## 2025-07-21 PROBLEM — L97.922 NON-PRESSURE CHRONIC ULCER OF LEFT LOWER LEG WITH FAT LAYER EXPOSED (HCC): Status: RESOLVED | Noted: 2025-06-09 | Resolved: 2025-07-21

## 2025-07-21 PROCEDURE — 99213 OFFICE O/P EST LOW 20 MIN: CPT | Performed by: SURGERY

## 2025-07-21 PROCEDURE — 99213 OFFICE O/P EST LOW 20 MIN: CPT

## 2025-07-21 RX ORDER — LIDOCAINE 50 MG/G
OINTMENT TOPICAL PRN
OUTPATIENT
Start: 2025-07-21

## 2025-07-21 RX ORDER — CLOBETASOL PROPIONATE 0.5 MG/G
OINTMENT TOPICAL PRN
OUTPATIENT
Start: 2025-07-21

## 2025-07-21 RX ORDER — BETAMETHASONE DIPROPIONATE 0.5 MG/G
CREAM TOPICAL PRN
OUTPATIENT
Start: 2025-07-21

## 2025-07-21 RX ORDER — LIDOCAINE 40 MG/G
CREAM TOPICAL PRN
OUTPATIENT
Start: 2025-07-21

## 2025-07-21 RX ORDER — LIDOCAINE HYDROCHLORIDE 40 MG/ML
SOLUTION TOPICAL PRN
OUTPATIENT
Start: 2025-07-21

## 2025-07-21 RX ORDER — TRIAMCINOLONE ACETONIDE 1 MG/G
OINTMENT TOPICAL PRN
OUTPATIENT
Start: 2025-07-21

## 2025-07-21 RX ORDER — NEOMYCIN/BACITRACIN/POLYMYXINB 3.5-400-5K
OINTMENT (GRAM) TOPICAL PRN
OUTPATIENT
Start: 2025-07-21

## 2025-07-21 RX ORDER — GINSENG 100 MG
CAPSULE ORAL PRN
OUTPATIENT
Start: 2025-07-21

## 2025-07-21 RX ORDER — SILVER SULFADIAZINE 10 MG/G
CREAM TOPICAL PRN
OUTPATIENT
Start: 2025-07-21

## 2025-07-21 RX ORDER — BACITRACIN ZINC AND POLYMYXIN B SULFATE 500; 1000 [USP'U]/G; [USP'U]/G
OINTMENT TOPICAL PRN
OUTPATIENT
Start: 2025-07-21

## 2025-07-21 RX ORDER — SODIUM CHLOR/HYPOCHLOROUS ACID 0.033 %
SOLUTION, IRRIGATION IRRIGATION PRN
OUTPATIENT
Start: 2025-07-21

## 2025-07-21 RX ORDER — GENTAMICIN SULFATE 1 MG/G
OINTMENT TOPICAL PRN
OUTPATIENT
Start: 2025-07-21

## 2025-07-21 RX ORDER — LIDOCAINE HYDROCHLORIDE 20 MG/ML
JELLY TOPICAL PRN
OUTPATIENT
Start: 2025-07-21

## 2025-07-21 RX ORDER — MUPIROCIN 2 %
OINTMENT (GRAM) TOPICAL PRN
OUTPATIENT
Start: 2025-07-21

## 2025-07-21 NOTE — PROGRESS NOTES
Wound care    The patient is a 89-year-old man who presents to the wound care center regarding wound on left elbow and right and left lower legs.  He has a history of frequent falls with skin and soft tissue injury.    I saw the patient for the first time in the wound care center on 6/9/2025.    The patient reports a long history of vertigo.  He reports that previous evaluations have not found a treatable process.    Past medical history includes pacemaker, atrial flutter, history of anticoagulation.  The patient still drives.  He does not report active heart or lung symptoms.  He is  and lives with his wife.  She has previously done his dressing changes.      Dressings as of 6/9/2025: Cover all wounds with Xeroform then dry gauze and roll gauze.  Change dressings every other day.        Reported weight 167 pounds height 5 feet 10 inches    Physical examination    The patient is an alert man in no acute distress.  He is hard of hearing.    Examination of the left upper extremity revealed 2+ radial pulse.  There was no edema of the left arm.  There was a wound on the outer aspect of the left elbow with dimensions as a cluster 5.9 x 2.8 x 0.1 with exuberant granulation.  Elbow skin tear was healed.    Examination of the right lower extremity did not reveal palpable dorsalis pedis or posterior tibial pulse.  Doppler signal at the right dorsalis pedis position was brisk and normal.  There was trace edema of the right lower leg.  On the proximal lateral aspect of the right lower leg the prior ulcer was healed.    Examination of the left lower extremity revealed 2+ dorsalis pedis pulse.  There was trace edema of the left lower leg.  On the medial aspect of the mid left lower leg the prior ulcer is healed.      Left elbow wound cluster with hypertrophic granulation was treated with silver nitrate.        Impression:    Skin tear at left elbow.  The wound cluster is persistent with exuberant granulation, not at goal 
06/13/25 1015   Undermining Ends___ O'Clock 12 06/13/25 1015   Undermining Maxium Distance (cm) 0.8 06/13/25 1015   Wound Assessment Pink/red;Hyper granulation tissue 07/21/25 1320   Drainage Amount Small (< 25%) 07/21/25 1320   Drainage Description Serosanguinous 07/21/25 1320   Odor None 07/21/25 1320   Pratima-wound Assessment Fragile 07/21/25 1320   Margins Flat/open edges 07/21/25 1320   Wound Thickness Description not for Pressure Injury Full thickness 07/21/25 1320   Number of days: 38    /82   Pulse 62   Temp 97.5 °F (36.4 °C) (Temporal)   Resp 16

## 2025-07-21 NOTE — PATIENT INSTRUCTIONS
greek yogurt, premier protein drink, fish, nuts )   [] Isaiah  [] Protien drink supplement   [] Recommended Supplements :      Activity:  Activity as tolerated:    [x] Patient has no activity restrictions       [] Strict Bedrest:   [] Remain off Work:     [] Return to work with restrictions:    [] May return to full duty work:                                              Return Appointment:    [x] Return Appointment: With Dr. Jorge Herndon  in  2 Week(s)    [] Follow up with Dr. Orlando as soon as possible      Wound Care Center Information: Should you experience any significant changes in your wound(s) or have questions about your wound care, please contact the [facility] at MONDAY-THURSDAY 8:00 am - 4:30 AND Friday 8:00 AM- 12:00 PM .  If you need help with your wound outside these hours and cannot wait until we are again available, contact your PCP or go to the hospital emergency room.     PLEASE NOTE: IF YOU ARE UNABLE TO OBTAIN WOUND SUPPLIES, CONTINUE TO USE THE SUPPLIES YOU HAVE AVAILABLE UNTIL YOU ARE ABLE TO REACH US. IT IS MOST IMPORTANT TO KEEP THE WOUND COVERED AT ALL TIMES.     Physician Signature:_______________________ Dr Herndon

## 2025-07-21 NOTE — FLOWSHEET NOTE
07/21/25 1332   Wound 06/13/25 Arm Left;Lateral #4   Date First Assessed/Time First Assessed: 06/13/25 1013   Present on Original Admission: Yes  Location: Arm  Wound Location Orientation: Left;Lateral  Wound Description (Comments): #4   Dressing/Treatment Xeroform;Gauze dressing/dressing sponge;ABD;Roll gauze;Tape/Soft cloth adhesive tape     Discharge Condition: Stable    Pain: 0    Ambulatory Status:Walking and Straight Cane    Discharge Destination: Home    Transportation:Car    Accompanied by: Self    Discharge instructions reviewed with Self and copy or written instructions have been provided. All questions/concerns have been addressed at this time.

## 2025-08-04 ENCOUNTER — HOSPITAL ENCOUNTER (OUTPATIENT)
Facility: HOSPITAL | Age: 89
Discharge: HOME | End: 2025-08-04
Attending: EMERGENCY MEDICINE
Payer: MEDICARE

## 2025-08-04 VITALS
DIASTOLIC BLOOD PRESSURE: 87 MMHG | HEART RATE: 69 BPM | RESPIRATION RATE: 16 BRPM | TEMPERATURE: 98.3 F | SYSTOLIC BLOOD PRESSURE: 144 MMHG

## 2025-08-04 DIAGNOSIS — S51.012D SKIN TEAR OF LEFT ELBOW WITHOUT COMPLICATION, SUBSEQUENT ENCOUNTER: Primary | ICD-10-CM

## 2025-08-04 PROCEDURE — 99213 OFFICE O/P EST LOW 20 MIN: CPT | Performed by: SURGERY

## 2025-08-04 PROCEDURE — 17250 CHEM CAUT OF GRANLTJ TISSUE: CPT

## 2025-08-04 RX ORDER — LIDOCAINE HYDROCHLORIDE 40 MG/ML
SOLUTION TOPICAL PRN
OUTPATIENT
Start: 2025-08-04

## 2025-08-04 RX ORDER — LIDOCAINE HYDROCHLORIDE 20 MG/ML
JELLY TOPICAL PRN
OUTPATIENT
Start: 2025-08-04

## 2025-08-04 RX ORDER — BETAMETHASONE DIPROPIONATE 0.5 MG/G
CREAM TOPICAL PRN
OUTPATIENT
Start: 2025-08-04

## 2025-08-04 RX ORDER — GENTAMICIN SULFATE 1 MG/G
OINTMENT TOPICAL PRN
OUTPATIENT
Start: 2025-08-04

## 2025-08-04 RX ORDER — MUPIROCIN 2 %
OINTMENT (GRAM) TOPICAL PRN
OUTPATIENT
Start: 2025-08-04

## 2025-08-04 RX ORDER — TRIAMCINOLONE ACETONIDE 1 MG/G
OINTMENT TOPICAL PRN
OUTPATIENT
Start: 2025-08-04

## 2025-08-04 RX ORDER — GINSENG 100 MG
CAPSULE ORAL PRN
OUTPATIENT
Start: 2025-08-04

## 2025-08-04 RX ORDER — LIDOCAINE 50 MG/G
OINTMENT TOPICAL PRN
OUTPATIENT
Start: 2025-08-04

## 2025-08-04 RX ORDER — SODIUM CHLOR/HYPOCHLOROUS ACID 0.033 %
SOLUTION, IRRIGATION IRRIGATION PRN
OUTPATIENT
Start: 2025-08-04

## 2025-08-04 RX ORDER — BACITRACIN ZINC AND POLYMYXIN B SULFATE 500; 1000 [USP'U]/G; [USP'U]/G
OINTMENT TOPICAL PRN
OUTPATIENT
Start: 2025-08-04

## 2025-08-04 RX ORDER — NEOMYCIN/BACITRACIN/POLYMYXINB 3.5-400-5K
OINTMENT (GRAM) TOPICAL PRN
OUTPATIENT
Start: 2025-08-04

## 2025-08-04 RX ORDER — LIDOCAINE 40 MG/G
CREAM TOPICAL PRN
OUTPATIENT
Start: 2025-08-04

## 2025-08-04 RX ORDER — SILVER SULFADIAZINE 10 MG/G
CREAM TOPICAL PRN
OUTPATIENT
Start: 2025-08-04

## 2025-08-04 RX ORDER — CLOBETASOL PROPIONATE 0.5 MG/G
OINTMENT TOPICAL PRN
OUTPATIENT
Start: 2025-08-04

## 2025-08-18 ENCOUNTER — HOSPITAL ENCOUNTER (OUTPATIENT)
Facility: HOSPITAL | Age: 89
Discharge: HOME | End: 2025-08-18
Attending: EMERGENCY MEDICINE
Payer: MEDICARE

## 2025-08-18 VITALS
TEMPERATURE: 97.9 F | HEART RATE: 69 BPM | DIASTOLIC BLOOD PRESSURE: 89 MMHG | SYSTOLIC BLOOD PRESSURE: 143 MMHG | RESPIRATION RATE: 14 BRPM

## 2025-08-18 DIAGNOSIS — S51.012D SKIN TEAR OF LEFT ELBOW WITHOUT COMPLICATION, SUBSEQUENT ENCOUNTER: Primary | ICD-10-CM

## 2025-08-18 PROCEDURE — 99213 OFFICE O/P EST LOW 20 MIN: CPT | Performed by: SURGERY

## 2025-08-18 PROCEDURE — 17250 CHEM CAUT OF GRANLTJ TISSUE: CPT

## 2025-08-18 PROCEDURE — 99213 OFFICE O/P EST LOW 20 MIN: CPT

## 2025-08-18 RX ORDER — LIDOCAINE HYDROCHLORIDE 20 MG/ML
JELLY TOPICAL PRN
OUTPATIENT
Start: 2025-08-18

## 2025-08-18 RX ORDER — MUPIROCIN 2 %
OINTMENT (GRAM) TOPICAL PRN
OUTPATIENT
Start: 2025-08-18

## 2025-08-18 RX ORDER — LIDOCAINE 40 MG/G
CREAM TOPICAL PRN
OUTPATIENT
Start: 2025-08-18

## 2025-08-18 RX ORDER — LIDOCAINE HYDROCHLORIDE 40 MG/ML
SOLUTION TOPICAL PRN
OUTPATIENT
Start: 2025-08-18

## 2025-08-18 RX ORDER — GENTAMICIN SULFATE 1 MG/G
OINTMENT TOPICAL PRN
OUTPATIENT
Start: 2025-08-18

## 2025-08-18 RX ORDER — SODIUM CHLOR/HYPOCHLOROUS ACID 0.033 %
SOLUTION, IRRIGATION IRRIGATION PRN
OUTPATIENT
Start: 2025-08-18

## 2025-08-18 RX ORDER — BACITRACIN ZINC AND POLYMYXIN B SULFATE 500; 1000 [USP'U]/G; [USP'U]/G
OINTMENT TOPICAL PRN
OUTPATIENT
Start: 2025-08-18

## 2025-08-18 RX ORDER — NEOMYCIN/BACITRACIN/POLYMYXINB 3.5-400-5K
OINTMENT (GRAM) TOPICAL PRN
OUTPATIENT
Start: 2025-08-18

## 2025-08-18 RX ORDER — GINSENG 100 MG
CAPSULE ORAL PRN
OUTPATIENT
Start: 2025-08-18

## 2025-08-18 RX ORDER — LIDOCAINE 50 MG/G
OINTMENT TOPICAL PRN
OUTPATIENT
Start: 2025-08-18

## 2025-08-18 RX ORDER — CLOBETASOL PROPIONATE 0.5 MG/G
OINTMENT TOPICAL PRN
OUTPATIENT
Start: 2025-08-18

## 2025-08-18 RX ORDER — SILVER SULFADIAZINE 10 MG/G
CREAM TOPICAL PRN
OUTPATIENT
Start: 2025-08-18

## 2025-08-18 RX ORDER — TRIAMCINOLONE ACETONIDE 1 MG/G
OINTMENT TOPICAL PRN
OUTPATIENT
Start: 2025-08-18

## 2025-08-18 RX ORDER — BETAMETHASONE DIPROPIONATE 0.5 MG/G
CREAM TOPICAL PRN
OUTPATIENT
Start: 2025-08-18

## 2025-08-18 ASSESSMENT — PAIN SCALES - GENERAL: PAINLEVEL_OUTOF10: 0

## 2025-08-25 ENCOUNTER — HOSPITAL ENCOUNTER (OUTPATIENT)
Facility: HOSPITAL | Age: 89
Discharge: HOME | End: 2025-08-25
Attending: EMERGENCY MEDICINE
Payer: MEDICARE

## 2025-08-25 VITALS
TEMPERATURE: 98.3 F | HEART RATE: 111 BPM | DIASTOLIC BLOOD PRESSURE: 69 MMHG | RESPIRATION RATE: 14 BRPM | SYSTOLIC BLOOD PRESSURE: 127 MMHG

## 2025-08-25 DIAGNOSIS — S51.012D SKIN TEAR OF LEFT ELBOW WITHOUT COMPLICATION, SUBSEQUENT ENCOUNTER: Primary | ICD-10-CM

## 2025-08-25 PROCEDURE — 99212 OFFICE O/P EST SF 10 MIN: CPT

## 2025-08-25 PROCEDURE — 99213 OFFICE O/P EST LOW 20 MIN: CPT | Performed by: SURGERY

## 2025-08-25 RX ORDER — GINSENG 100 MG
CAPSULE ORAL PRN
OUTPATIENT
Start: 2025-08-25

## 2025-08-25 RX ORDER — LIDOCAINE HYDROCHLORIDE 20 MG/ML
JELLY TOPICAL PRN
OUTPATIENT
Start: 2025-08-25

## 2025-08-25 RX ORDER — LIDOCAINE 40 MG/G
CREAM TOPICAL PRN
OUTPATIENT
Start: 2025-08-25

## 2025-08-25 RX ORDER — LIDOCAINE 50 MG/G
OINTMENT TOPICAL PRN
OUTPATIENT
Start: 2025-08-25

## 2025-08-25 RX ORDER — SILVER SULFADIAZINE 10 MG/G
CREAM TOPICAL PRN
OUTPATIENT
Start: 2025-08-25

## 2025-08-25 RX ORDER — BETAMETHASONE DIPROPIONATE 0.5 MG/G
CREAM TOPICAL PRN
OUTPATIENT
Start: 2025-08-25

## 2025-08-25 RX ORDER — LIDOCAINE HYDROCHLORIDE 40 MG/ML
SOLUTION TOPICAL PRN
OUTPATIENT
Start: 2025-08-25

## 2025-08-25 RX ORDER — TRIAMCINOLONE ACETONIDE 1 MG/G
OINTMENT TOPICAL PRN
OUTPATIENT
Start: 2025-08-25

## 2025-08-25 RX ORDER — GENTAMICIN SULFATE 1 MG/G
OINTMENT TOPICAL PRN
OUTPATIENT
Start: 2025-08-25

## 2025-08-25 RX ORDER — BACITRACIN ZINC AND POLYMYXIN B SULFATE 500; 1000 [USP'U]/G; [USP'U]/G
OINTMENT TOPICAL PRN
OUTPATIENT
Start: 2025-08-25

## 2025-08-25 RX ORDER — SODIUM CHLOR/HYPOCHLOROUS ACID 0.033 %
SOLUTION, IRRIGATION IRRIGATION PRN
OUTPATIENT
Start: 2025-08-25

## 2025-08-25 RX ORDER — NEOMYCIN/BACITRACIN/POLYMYXINB 3.5-400-5K
OINTMENT (GRAM) TOPICAL PRN
OUTPATIENT
Start: 2025-08-25

## 2025-08-25 RX ORDER — CLOBETASOL PROPIONATE 0.5 MG/G
OINTMENT TOPICAL PRN
OUTPATIENT
Start: 2025-08-25

## 2025-08-25 RX ORDER — MUPIROCIN 2 %
OINTMENT (GRAM) TOPICAL PRN
OUTPATIENT
Start: 2025-08-25

## 2025-08-25 ASSESSMENT — PAIN SCALES - GENERAL: PAINLEVEL_OUTOF10: 0
